# Patient Record
Sex: MALE | Race: NATIVE HAWAIIAN OR OTHER PACIFIC ISLANDER | NOT HISPANIC OR LATINO | Employment: STUDENT | URBAN - METROPOLITAN AREA
[De-identification: names, ages, dates, MRNs, and addresses within clinical notes are randomized per-mention and may not be internally consistent; named-entity substitution may affect disease eponyms.]

---

## 2022-07-02 ENCOUNTER — HOSPITAL ENCOUNTER (OUTPATIENT)
Facility: CLINIC | Age: 22
Setting detail: OBSERVATION
Discharge: HOME | End: 2022-07-03
Attending: EMERGENCY MEDICINE | Admitting: EMERGENCY MEDICINE
Payer: COMMERCIAL

## 2022-07-02 ENCOUNTER — APPOINTMENT (OUTPATIENT)
Dept: CT IMAGING | Facility: CLINIC | Age: 22
End: 2022-07-02
Attending: EMERGENCY MEDICINE
Payer: COMMERCIAL

## 2022-07-02 DIAGNOSIS — J36 PERITONSILLAR ABSCESS: ICD-10-CM

## 2022-07-02 LAB
ALBUMIN SERPL-MCNC: 3.9 G/DL (ref 3.4–5)
ALP SERPL-CCNC: 78 U/L (ref 65–260)
ALT SERPL W P-5'-P-CCNC: 25 U/L (ref 0–50)
ANION GAP SERPL CALCULATED.3IONS-SCNC: 4 MMOL/L (ref 3–14)
AST SERPL W P-5'-P-CCNC: 15 U/L (ref 0–35)
BASOPHILS # BLD AUTO: 0 10E3/UL (ref 0–0.2)
BASOPHILS NFR BLD AUTO: 0 %
BILIRUB SERPL-MCNC: 1.4 MG/DL (ref 0.2–1.3)
BUN SERPL-MCNC: 12 MG/DL (ref 7–30)
CALCIUM SERPL-MCNC: 9.5 MG/DL (ref 8.5–10.1)
CHLORIDE BLD-SCNC: 102 MMOL/L (ref 98–110)
CO2 SERPL-SCNC: 28 MMOL/L (ref 20–32)
CREAT SERPL-MCNC: 1.03 MG/DL (ref 0.5–1)
DEPRECATED S PYO AG THROAT QL EIA: POSITIVE
EOSINOPHIL # BLD AUTO: 0.1 10E3/UL (ref 0–0.7)
EOSINOPHIL NFR BLD AUTO: 1 %
ERYTHROCYTE [DISTWIDTH] IN BLOOD BY AUTOMATED COUNT: 11.6 % (ref 10–15)
FLUAV RNA SPEC QL NAA+PROBE: NEGATIVE
FLUBV RNA RESP QL NAA+PROBE: NEGATIVE
GFR SERPL CREATININE-BSD FRML MDRD: >90 ML/MIN/1.73M2
GLUCOSE BLD-MCNC: 88 MG/DL (ref 70–99)
HCT VFR BLD AUTO: 42.8 % (ref 40–53)
HGB BLD-MCNC: 14.5 G/DL (ref 13.3–17.7)
IMM GRANULOCYTES # BLD: 0 10E3/UL
IMM GRANULOCYTES NFR BLD: 0 %
LYMPHOCYTES # BLD AUTO: 0.9 10E3/UL (ref 0.8–5.3)
LYMPHOCYTES NFR BLD AUTO: 9 %
MCH RBC QN AUTO: 29.5 PG (ref 26.5–33)
MCHC RBC AUTO-ENTMCNC: 33.9 G/DL (ref 31.5–36.5)
MCV RBC AUTO: 87 FL (ref 78–100)
MONOCYTES # BLD AUTO: 1 10E3/UL (ref 0–1.3)
MONOCYTES NFR BLD AUTO: 10 %
MONOCYTES NFR BLD AUTO: NEGATIVE %
NEUTROPHILS # BLD AUTO: 8.5 10E3/UL (ref 1.6–8.3)
NEUTROPHILS NFR BLD AUTO: 80 %
NRBC # BLD AUTO: 0 10E3/UL
NRBC BLD AUTO-RTO: 0 /100
PLATELET # BLD AUTO: 297 10E3/UL (ref 150–450)
POTASSIUM BLD-SCNC: 3.7 MMOL/L (ref 3.4–5.3)
PROT SERPL-MCNC: 7.9 G/DL (ref 6.8–8.8)
RBC # BLD AUTO: 4.91 10E6/UL (ref 4.4–5.9)
RSV RNA SPEC NAA+PROBE: NEGATIVE
SARS-COV-2 RNA RESP QL NAA+PROBE: NEGATIVE
SODIUM SERPL-SCNC: 134 MMOL/L (ref 133–144)
WBC # BLD AUTO: 10.6 10E3/UL (ref 4–11)

## 2022-07-02 PROCEDURE — 36415 COLL VENOUS BLD VENIPUNCTURE: CPT | Performed by: EMERGENCY MEDICINE

## 2022-07-02 PROCEDURE — 70491 CT SOFT TISSUE NECK W/DYE: CPT

## 2022-07-02 PROCEDURE — 250N000009 HC RX 250: Performed by: EMERGENCY MEDICINE

## 2022-07-02 PROCEDURE — 96361 HYDRATE IV INFUSION ADD-ON: CPT | Mod: 59

## 2022-07-02 PROCEDURE — 87070 CULTURE OTHR SPECIMN AEROBIC: CPT | Performed by: EMERGENCY MEDICINE

## 2022-07-02 PROCEDURE — 250N000011 HC RX IP 250 OP 636: Performed by: INTERNAL MEDICINE

## 2022-07-02 PROCEDURE — 99285 EMERGENCY DEPT VISIT HI MDM: CPT | Mod: 25

## 2022-07-02 PROCEDURE — 96376 TX/PRO/DX INJ SAME DRUG ADON: CPT | Mod: 59

## 2022-07-02 PROCEDURE — 85025 COMPLETE CBC W/AUTO DIFF WBC: CPT | Performed by: EMERGENCY MEDICINE

## 2022-07-02 PROCEDURE — 250N000011 HC RX IP 250 OP 636: Performed by: EMERGENCY MEDICINE

## 2022-07-02 PROCEDURE — 258N000003 HC RX IP 258 OP 636: Performed by: EMERGENCY MEDICINE

## 2022-07-02 PROCEDURE — 250N000013 HC RX MED GY IP 250 OP 250 PS 637: Performed by: INTERNAL MEDICINE

## 2022-07-02 PROCEDURE — 87880 STREP A ASSAY W/OPTIC: CPT | Performed by: EMERGENCY MEDICINE

## 2022-07-02 PROCEDURE — 99219 PR INITIAL OBSERVATION CARE,LEVEL II: CPT | Performed by: INTERNAL MEDICINE

## 2022-07-02 PROCEDURE — 82040 ASSAY OF SERUM ALBUMIN: CPT | Performed by: EMERGENCY MEDICINE

## 2022-07-02 PROCEDURE — 96375 TX/PRO/DX INJ NEW DRUG ADDON: CPT

## 2022-07-02 PROCEDURE — 80053 COMPREHEN METABOLIC PANEL: CPT | Performed by: EMERGENCY MEDICINE

## 2022-07-02 PROCEDURE — 96366 THER/PROPH/DIAG IV INF ADDON: CPT | Mod: 59

## 2022-07-02 PROCEDURE — G0378 HOSPITAL OBSERVATION PER HR: HCPCS

## 2022-07-02 PROCEDURE — 96365 THER/PROPH/DIAG IV INF INIT: CPT | Mod: 59

## 2022-07-02 PROCEDURE — 10160 PNXR ASPIR ABSC HMTMA BULLA: CPT

## 2022-07-02 PROCEDURE — 86308 HETEROPHILE ANTIBODY SCREEN: CPT | Performed by: EMERGENCY MEDICINE

## 2022-07-02 PROCEDURE — 87637 SARSCOV2&INF A&B&RSV AMP PRB: CPT | Performed by: EMERGENCY MEDICINE

## 2022-07-02 RX ORDER — DEXTROAMPHETAMINE SACCHARATE, AMPHETAMINE ASPARTATE, DEXTROAMPHETAMINE SULFATE AND AMPHETAMINE SULFATE 2.5; 2.5; 2.5; 2.5 MG/1; MG/1; MG/1; MG/1
TABLET ORAL
COMMUNITY
Start: 2022-03-13

## 2022-07-02 RX ORDER — IOPAMIDOL 755 MG/ML
500 INJECTION, SOLUTION INTRAVASCULAR ONCE
Status: COMPLETED | OUTPATIENT
Start: 2022-07-02 | End: 2022-07-02

## 2022-07-02 RX ORDER — ONDANSETRON 4 MG/1
4 TABLET, ORALLY DISINTEGRATING ORAL EVERY 6 HOURS PRN
Status: DISCONTINUED | OUTPATIENT
Start: 2022-07-02 | End: 2022-07-03 | Stop reason: HOSPADM

## 2022-07-02 RX ORDER — ONDANSETRON 2 MG/ML
4 INJECTION INTRAMUSCULAR; INTRAVENOUS ONCE
Status: COMPLETED | OUTPATIENT
Start: 2022-07-02 | End: 2022-07-02

## 2022-07-02 RX ORDER — DIPHENHYDRAMINE HCL 25 MG
25-50 CAPSULE ORAL
Status: DISCONTINUED | OUTPATIENT
Start: 2022-07-02 | End: 2022-07-03 | Stop reason: HOSPADM

## 2022-07-02 RX ORDER — AMPICILLIN AND SULBACTAM 2; 1 G/1; G/1
3 INJECTION, POWDER, FOR SOLUTION INTRAMUSCULAR; INTRAVENOUS ONCE
Status: COMPLETED | OUTPATIENT
Start: 2022-07-02 | End: 2022-07-02

## 2022-07-02 RX ORDER — AMOXICILLIN 250 MG
1 CAPSULE ORAL 2 TIMES DAILY PRN
Status: DISCONTINUED | OUTPATIENT
Start: 2022-07-02 | End: 2022-07-03 | Stop reason: HOSPADM

## 2022-07-02 RX ORDER — AMPICILLIN AND SULBACTAM 2; 1 G/1; G/1
3 INJECTION, POWDER, FOR SOLUTION INTRAMUSCULAR; INTRAVENOUS EVERY 6 HOURS
Status: DISCONTINUED | OUTPATIENT
Start: 2022-07-02 | End: 2022-07-03 | Stop reason: HOSPADM

## 2022-07-02 RX ORDER — DEXAMETHASONE SODIUM PHOSPHATE 10 MG/ML
8 INJECTION, SOLUTION INTRAMUSCULAR; INTRAVENOUS EVERY 8 HOURS
Status: DISCONTINUED | OUTPATIENT
Start: 2022-07-03 | End: 2022-07-03 | Stop reason: HOSPADM

## 2022-07-02 RX ORDER — KETOROLAC TROMETHAMINE 15 MG/ML
15 INJECTION, SOLUTION INTRAMUSCULAR; INTRAVENOUS ONCE
Status: COMPLETED | OUTPATIENT
Start: 2022-07-02 | End: 2022-07-02

## 2022-07-02 RX ORDER — DIPHENHYDRAMINE HYDROCHLORIDE 50 MG/ML
25 INJECTION INTRAMUSCULAR; INTRAVENOUS EVERY 6 HOURS PRN
Status: DISCONTINUED | OUTPATIENT
Start: 2022-07-02 | End: 2022-07-03 | Stop reason: HOSPADM

## 2022-07-02 RX ORDER — DEXTROAMPHETAMINE SULFATE, DEXTROAMPHETAMINE SACCHARATE, AMPHETAMINE SULFATE AND AMPHETAMINE ASPARTATE 7.5; 7.5; 7.5; 7.5 MG/1; MG/1; MG/1; MG/1
CAPSULE, EXTENDED RELEASE ORAL
COMMUNITY
Start: 2022-03-13

## 2022-07-02 RX ORDER — AMOXICILLIN 250 MG
2 CAPSULE ORAL 2 TIMES DAILY PRN
Status: DISCONTINUED | OUTPATIENT
Start: 2022-07-02 | End: 2022-07-03 | Stop reason: HOSPADM

## 2022-07-02 RX ORDER — MENTHOL
LOZENGE MUCOUS MEMBRANE DAILY PRN
Status: DISCONTINUED | OUTPATIENT
Start: 2022-07-02 | End: 2022-07-03 | Stop reason: HOSPADM

## 2022-07-02 RX ORDER — LIDOCAINE HYDROCHLORIDE AND EPINEPHRINE BITARTRATE 20; .01 MG/ML; MG/ML
1 INJECTION, SOLUTION SUBCUTANEOUS ONCE
Status: DISCONTINUED | OUTPATIENT
Start: 2022-07-02 | End: 2022-07-02

## 2022-07-02 RX ORDER — ACETAMINOPHEN 325 MG/1
650 TABLET ORAL EVERY 6 HOURS PRN
Status: DISCONTINUED | OUTPATIENT
Start: 2022-07-02 | End: 2022-07-03 | Stop reason: HOSPADM

## 2022-07-02 RX ORDER — IBUPROFEN 200 MG
200 TABLET ORAL EVERY 6 HOURS PRN
Status: DISCONTINUED | OUTPATIENT
Start: 2022-07-02 | End: 2022-07-03 | Stop reason: HOSPADM

## 2022-07-02 RX ORDER — PANTOPRAZOLE SODIUM 40 MG/1
40 TABLET, DELAYED RELEASE ORAL
Status: DISCONTINUED | OUTPATIENT
Start: 2022-07-03 | End: 2022-07-03 | Stop reason: HOSPADM

## 2022-07-02 RX ORDER — LIDOCAINE HYDROCHLORIDE AND EPINEPHRINE 10; 10 MG/ML; UG/ML
INJECTION, SOLUTION INFILTRATION; PERINEURAL
Status: DISCONTINUED
Start: 2022-07-02 | End: 2022-07-02 | Stop reason: HOSPADM

## 2022-07-02 RX ORDER — DEXAMETHASONE SODIUM PHOSPHATE 10 MG/ML
10 INJECTION, SOLUTION INTRAMUSCULAR; INTRAVENOUS ONCE
Status: COMPLETED | OUTPATIENT
Start: 2022-07-02 | End: 2022-07-02

## 2022-07-02 RX ORDER — POLYETHYLENE GLYCOL 3350 17 G/17G
17 POWDER, FOR SOLUTION ORAL DAILY PRN
Status: DISCONTINUED | OUTPATIENT
Start: 2022-07-02 | End: 2022-07-03 | Stop reason: HOSPADM

## 2022-07-02 RX ORDER — HYDROMORPHONE HYDROCHLORIDE 1 MG/ML
0.5 INJECTION, SOLUTION INTRAMUSCULAR; INTRAVENOUS; SUBCUTANEOUS
Status: COMPLETED | OUTPATIENT
Start: 2022-07-02 | End: 2022-07-02

## 2022-07-02 RX ORDER — SODIUM CHLORIDE 9 MG/ML
INJECTION, SOLUTION INTRAVENOUS CONTINUOUS
Status: DISCONTINUED | OUTPATIENT
Start: 2022-07-02 | End: 2022-07-02

## 2022-07-02 RX ORDER — ONDANSETRON 2 MG/ML
4 INJECTION INTRAMUSCULAR; INTRAVENOUS EVERY 6 HOURS PRN
Status: DISCONTINUED | OUTPATIENT
Start: 2022-07-02 | End: 2022-07-03 | Stop reason: HOSPADM

## 2022-07-02 RX ORDER — ACETAMINOPHEN 650 MG/1
650 SUPPOSITORY RECTAL EVERY 6 HOURS PRN
Status: DISCONTINUED | OUTPATIENT
Start: 2022-07-02 | End: 2022-07-03 | Stop reason: HOSPADM

## 2022-07-02 RX ORDER — DEXTROAMPHETAMINE SACCHARATE, AMPHETAMINE ASPARTATE, DEXTROAMPHETAMINE SULFATE AND AMPHETAMINE SULFATE 7.5; 7.5; 7.5; 7.5 MG/1; MG/1; MG/1; MG/1
30 TABLET ORAL EVERY MORNING
COMMUNITY
End: 2022-07-02

## 2022-07-02 RX ORDER — DEXTROAMPHETAMINE SACCHARATE, AMPHETAMINE ASPARTATE MONOHYDRATE, DEXTROAMPHETAMINE SULFATE AND AMPHETAMINE SULFATE 3.75; 3.75; 3.75; 3.75 MG/1; MG/1; MG/1; MG/1
30 CAPSULE, EXTENDED RELEASE ORAL EVERY MORNING
Status: DISCONTINUED | OUTPATIENT
Start: 2022-07-03 | End: 2022-07-03 | Stop reason: HOSPADM

## 2022-07-02 RX ADMIN — DEXAMETHASONE SODIUM PHOSPHATE 8 MG: 10 INJECTION, SOLUTION INTRAMUSCULAR; INTRAVENOUS at 23:46

## 2022-07-02 RX ADMIN — IOPAMIDOL 90 ML: 755 INJECTION, SOLUTION INTRAVENOUS at 17:25

## 2022-07-02 RX ADMIN — KETOROLAC TROMETHAMINE 15 MG: 15 INJECTION, SOLUTION INTRAMUSCULAR; INTRAVENOUS at 16:58

## 2022-07-02 RX ADMIN — SODIUM CHLORIDE 65 ML: 9 INJECTION, SOLUTION INTRAVENOUS at 17:25

## 2022-07-02 RX ADMIN — AMPICILLIN SODIUM AND SULBACTAM SODIUM 3 G: 2; 1 INJECTION, POWDER, FOR SOLUTION INTRAMUSCULAR; INTRAVENOUS at 22:35

## 2022-07-02 RX ADMIN — IBUPROFEN 200 MG: 200 TABLET, FILM COATED ORAL at 22:40

## 2022-07-02 RX ADMIN — HYDROMORPHONE HYDROCHLORIDE 0.5 MG: 1 INJECTION, SOLUTION INTRAMUSCULAR; INTRAVENOUS; SUBCUTANEOUS at 17:01

## 2022-07-02 RX ADMIN — AMPICILLIN SODIUM AND SULBACTAM SODIUM 3 G: 2; 1 INJECTION, POWDER, FOR SOLUTION INTRAMUSCULAR; INTRAVENOUS at 17:02

## 2022-07-02 RX ADMIN — Medication 1 MG: at 23:47

## 2022-07-02 RX ADMIN — SODIUM CHLORIDE 1000 ML: 9 INJECTION, SOLUTION INTRAVENOUS at 15:42

## 2022-07-02 RX ADMIN — DEXAMETHASONE SODIUM PHOSPHATE 10 MG: 10 INJECTION, SOLUTION INTRAMUSCULAR; INTRAVENOUS at 16:59

## 2022-07-02 RX ADMIN — ONDANSETRON 4 MG: 2 INJECTION INTRAMUSCULAR; INTRAVENOUS at 15:43

## 2022-07-02 RX ADMIN — HYDROMORPHONE HYDROCHLORIDE 0.5 MG: 1 INJECTION, SOLUTION INTRAMUSCULAR; INTRAVENOUS; SUBCUTANEOUS at 15:44

## 2022-07-02 NOTE — ED TRIAGE NOTES
Pt arrives with c/o 4 months of ongoing illness including sinus infections, bronchitis, and most recently strep. Pt has tested negative for covid. Pt reports headache, ear pain, neck pain, throat pain. Pt reports 10lb weight loss in the last week. No meds taken PTA.     Triage Assessment     Row Name 07/02/22 1254       Triage Assessment (Adult)    Airway WDL WDL       Respiratory WDL    Respiratory WDL X;cough       Skin Circulation/Temperature WDL    Skin Circulation/Temperature WDL WDL       Cardiac WDL    Cardiac WDL WDL       Peripheral/Neurovascular WDL    Peripheral Neurovascular WDL WDL       Cognitive/Neuro/Behavioral WDL    Cognitive/Neuro/Behavioral WDL WDL

## 2022-07-02 NOTE — ED PROVIDER NOTES
History   Chief Complaint:  Multiple Complaints       The history is provided by the patient and a parent.      Mohinder Rodriguez is a 21 year old male who presents with 4 months of ongoing illness including sinus infections, bronchitis, and strep. He has been experiencing ongoing sore throat, ear pain, headaches, cough, congestion, neck pain, unintentional weight loss, and decreased appetite and fluid intake. He has not taken any oral analgesics today. He states that he experiences chest pain during hard coughing episodes. He denies fever, abdominal pain, dysuria, frequency, vomiting, or shortness of breath.    His mother notes that he may have had COVID-19 while at college, although he tested negative. During this time, he lost his ability to taste.    Review of Systems   All other systems reviewed and are negative.    Allergies:  The patient has no known allergies.     Medications:  Catapres  Concerta  Zoloft  Adderall    Past Medical History:     ADHD  Anxiety     Past Surgical History:    None    Family History:    None    Social History:  The patient presents to the ED with his mother.    Physical Exam     Patient Vitals for the past 24 hrs:   BP Temp Temp src Pulse Resp SpO2   07/02/22 2000 134/76 -- -- -- 16 99 %   07/02/22 1715 -- -- -- -- -- 95 %   07/02/22 1700 -- -- -- -- -- 98 %   07/02/22 1645 -- -- -- -- -- 97 %   07/02/22 1630 -- -- -- -- -- 96 %   07/02/22 1615 -- -- -- -- -- 97 %   07/02/22 1600 -- -- -- -- -- 95 %   07/02/22 1254 (!) 144/75 99  F (37.2  C) Temporal 103 18 99 %       Physical Exam  General: Resting on the bed.  Skin night secretions well.  Appears comfortable.  Head: No obvious trauma to head.  Ears, Nose, Throat:  External ears normal.  Nose normal.  Trismus noted.  Soft floor the mouth.  Fullness and swelling appreciable on the left tonsil compared to the right.  Mild midline shift of the uvula.  Mild erythema and injection of the TM on the left, no fluid appreciable.  Clear  right TM.  Eyes:  Conjunctivae clear.  Pupils are equal, round, and reactive.   Neck: Normal range of motion.  Neck supple.  reports mild left sided anterior cervical chain discomfort.  No appreciable pain over the cricothyroid membrane.  CV: Regular rate and rhythm.  No murmurs.      Respiratory: Effort normal and breath sounds normal.  No wheezing or crackles.   Gastrointestinal: Soft.  No distension. There is no tenderness.     Neuro: Alert. Moving all extremities appropriately.  Normal speech.    Skin: Skin is warm and dry.  No rash noted.       Emergency Department Course     Imaging:  Soft tissue neck CT w contrast   Final Result   IMPRESSION:    1.  Markedly enlarged tonsils with a 2 x 2 centimeters peritonsillar abscess on the left.   2.  Laboratory changes extend inferiorly to involve the left side of the hypopharynx and supraglottic larynx consistent with supraglottitis. Mild thickening of the epiglottis on the left.   3.  There is moderate narrowing of the oropharyngeal airway.        Report per radiology    Laboratory:  Labs Ordered and Resulted from Time of ED Arrival to Time of ED Departure   COMPREHENSIVE METABOLIC PANEL - Abnormal       Result Value    Sodium 134      Potassium 3.7      Chloride 102      Carbon Dioxide (CO2) 28      Anion Gap 4      Urea Nitrogen 12      Creatinine 1.03 (*)     Calcium 9.5      Glucose 88      Alkaline Phosphatase 78      AST 15      ALT 25      Protein Total 7.9      Albumin 3.9      Bilirubin Total 1.4 (*)     GFR Estimate >90     CBC WITH PLATELETS AND DIFFERENTIAL - Abnormal    WBC Count 10.6      RBC Count 4.91      Hemoglobin 14.5      Hematocrit 42.8      MCV 87      MCH 29.5      MCHC 33.9      RDW 11.6      Platelet Count 297      % Neutrophils 80      % Lymphocytes 9      % Monocytes 10      % Eosinophils 1      % Basophils 0      % Immature Granulocytes 0      NRBCs per 100 WBC 0      Absolute Neutrophils 8.5 (*)     Absolute Lymphocytes 0.9      Absolute  Monocytes 1.0      Absolute Eosinophils 0.1      Absolute Basophils 0.0      Absolute Immature Granulocytes 0.0      Absolute NRBCs 0.0     STREPTOCOCCUS A RAPID SCREEN W REFELX TO PCR - Abnormal    Group A Strep antigen Positive (*)    MONONUCLEOSIS SCREEN - Normal    Mononucleosis Screen Negative     INFLUENZA A/B & SARS-COV2 PCR MULTIPLEX - Normal    Influenza A PCR Negative      Influenza B PCR Negative      RSV PCR Negative      SARS CoV2 PCR Negative     AEROBIC BACTERIAL CULTURE ROUTINE        Procedures    Incision and Drainage     Procedure: Incision and Drainage     Consent: Verbal    Indication: Abscess    Location: Left Tonsil    Size: 2x2 cm    Ultrasound Guidance: No    Preparation: None     Anesthesia/Sedation: Lidocaine with Epinephrine - 1% and Benzocaine 20%     Procedure Detail:   Aspiration: Yes, 2.5 CCs  Incision Type: 18 gauge needle aspiration  Scalpel: None  Wound Management: Left open   Packing: None     Patient Status: The patient tolerated the procedure well: Yes. There were no complications.    Emergency Department Course:    Reviewed:  I reviewed nursing notes, vitals, past medical history and Care Everywhere    Assessments:  1511 I obtained history and examined the patient as noted above.   1650 I rechecked the patient and explained findings.   1950 I rechecked the patient and performed the procedure noted above.    Consults:  1923 I spoke with Dr. Lauren Cruz with ENT regarding this patient's presentation, findings, and plan of care.  1943 I spoke with Dr. Berry from the hospitalist service regarding the patient's presentation, findings here in the ED, and plan of care.     Interventions:  1542 NS 1 L IV  1543 Zofran 4 mg IV  1544 Dilaudid 0.5 mg IV  1658 Toradol 15 mg IV  1659 Decadron 10 mg IV  1701 Dilaudid 0.5 mg IV  1725 Iopamidol 90 ml IV   Saline 65 ml IV    Disposition:  The patient was admitted to the hospital under the care of Dr. Berry.     Impression & Plan     Medical  Decision Makin-year-old male presents with sore throat, decreased appetite.  Vital signs are reassuring.  Broad differentials pursued include not limited to sepsis, peritonsillar abscess, retropharyngeal abscess, epiglottitis, tracheitis, dehydration, mono, COVID-19, influenza, strep pharyngitis, Terence's angina, etc.  CBC without leukocytosis or anemia.  BMP without acute electrolyte, metabolic or renal dysfunction.  LFTs reassuring.  Mono negative.  COVID influenza negative.  Rapid strep positive.  On examination there is significant swelling of the left tonsil with midline shift of the uvula.  Patient also has noted trismus.  Given his tenderness down his neck I did obtain a CT scan.  CT confirms a left peritonsillar abscess but also some inflammation extending into the inferior left hypopharynx and supraglottic area consistent with supraglottitis.  Patient is handling secretions well.  No difficulty breathing.  Eating and drinking normally.  I spoke with ENT they agree with needle aspiration of the PTA, admission for Decadron and Unasyn.  After aspiration there was a notable decrease in the swelling on exam.  Patient looks improved.  Patient was admitted to the hospitalist.    Diagnosis:    ICD-10-CM    1. Peritonsillar abscess  J36        Scribe Disclosure:  Krish BRADEN, am serving as a scribe at 2:02 PM on 2022 to document services personally performed by Sarah Rand MD based on my observations and the provider's statements to me.          Sarah Rand MD  [phone]

## 2022-07-02 NOTE — PHARMACY-ADMISSION MEDICATION HISTORY
Admission medication history interview status for this patient is complete. See [facility] admission navigator for allergy information, prior to admission medications and immunization status.     Medication history interview done, indicate source(s): Patient  Medication history resources (including written lists, pill bottles, clinic record): [facility] dispense records  Pharmacy: [facility]/pharmacy #[id number] - [place], [place] - [address] AT [facility] [phone]      Changes made to PTA medication list:  Added: Adderall      Actions taken by pharmacist (provider contacted, etc):None     Additional medication history information: Only takes Adderall during school year.    Medication reconciliation/reorder completed by provider prior to medication history?  No      Prior to Admission medications    Medication Sig Last Dose Taking? Auth Provider Long Term End Date   ADDERALL XR 30 MG 24 hr capsule TAKE 1 CAPSULE (30 MG) BY MOUTH DAILY IN THE MORNING More than a month at Unknown time Yes Unknown, Entered By History     amphetamine-dextroamphetamine (ADDERALL) 10 MG tablet Take 1 to 2 tablets (10-20 mg) by mouth as needed More than a month at Unknown time Yes Unknown, Entered By History

## 2022-07-03 VITALS
BODY MASS INDEX: 28.32 KG/M2 | OXYGEN SATURATION: 97 % | WEIGHT: 191.2 LBS | TEMPERATURE: 97.9 F | SYSTOLIC BLOOD PRESSURE: 113 MMHG | HEIGHT: 69 IN | HEART RATE: 80 BPM | DIASTOLIC BLOOD PRESSURE: 51 MMHG | RESPIRATION RATE: 20 BRPM

## 2022-07-03 LAB
ANION GAP SERPL CALCULATED.3IONS-SCNC: 6 MMOL/L (ref 3–14)
BUN SERPL-MCNC: 15 MG/DL (ref 7–30)
CALCIUM SERPL-MCNC: 10.1 MG/DL (ref 8.5–10.1)
CHLORIDE BLD-SCNC: 101 MMOL/L (ref 98–110)
CO2 SERPL-SCNC: 29 MMOL/L (ref 20–32)
CREAT SERPL-MCNC: 0.95 MG/DL (ref 0.5–1)
ERYTHROCYTE [DISTWIDTH] IN BLOOD BY AUTOMATED COUNT: 11.4 % (ref 10–15)
GFR SERPL CREATININE-BSD FRML MDRD: >90 ML/MIN/1.73M2
GLUCOSE BLD-MCNC: 143 MG/DL (ref 70–99)
HCT VFR BLD AUTO: 43.3 % (ref 40–53)
HGB BLD-MCNC: 14.5 G/DL (ref 13.3–17.7)
MCH RBC QN AUTO: 29.2 PG (ref 26.5–33)
MCHC RBC AUTO-ENTMCNC: 33.5 G/DL (ref 31.5–36.5)
MCV RBC AUTO: 87 FL (ref 78–100)
PLATELET # BLD AUTO: 323 10E3/UL (ref 150–450)
POTASSIUM BLD-SCNC: 4.3 MMOL/L (ref 3.4–5.3)
RBC # BLD AUTO: 4.96 10E6/UL (ref 4.4–5.9)
SODIUM SERPL-SCNC: 136 MMOL/L (ref 133–144)
WBC # BLD AUTO: 10.1 10E3/UL (ref 4–11)

## 2022-07-03 PROCEDURE — 250N000013 HC RX MED GY IP 250 OP 250 PS 637: Performed by: INTERNAL MEDICINE

## 2022-07-03 PROCEDURE — 85027 COMPLETE CBC AUTOMATED: CPT | Performed by: INTERNAL MEDICINE

## 2022-07-03 PROCEDURE — 99217 PR OBSERVATION CARE DISCHARGE: CPT | Performed by: PHYSICIAN ASSISTANT

## 2022-07-03 PROCEDURE — 82310 ASSAY OF CALCIUM: CPT | Performed by: INTERNAL MEDICINE

## 2022-07-03 PROCEDURE — G0378 HOSPITAL OBSERVATION PER HR: HCPCS

## 2022-07-03 PROCEDURE — 36415 COLL VENOUS BLD VENIPUNCTURE: CPT | Performed by: INTERNAL MEDICINE

## 2022-07-03 PROCEDURE — 96376 TX/PRO/DX INJ SAME DRUG ADON: CPT

## 2022-07-03 PROCEDURE — 250N000011 HC RX IP 250 OP 636: Performed by: INTERNAL MEDICINE

## 2022-07-03 RX ORDER — FLUTICASONE PROPIONATE 50 MCG
2 SPRAY, SUSPENSION (ML) NASAL DAILY
Qty: 16 G | Refills: 0 | Status: SHIPPED | OUTPATIENT
Start: 2022-07-03 | End: 2022-07-17

## 2022-07-03 RX ORDER — ACETAMINOPHEN 325 MG/1
650 TABLET ORAL EVERY 6 HOURS PRN
COMMUNITY
Start: 2022-07-03 | End: 2023-08-09

## 2022-07-03 RX ORDER — IBUPROFEN 200 MG
400 TABLET ORAL EVERY 6 HOURS PRN
COMMUNITY
Start: 2022-07-03 | End: 2023-08-09

## 2022-07-03 RX ORDER — PANTOPRAZOLE SODIUM 40 MG/1
40 TABLET, DELAYED RELEASE ORAL
Qty: 20 TABLET | Refills: 0 | Status: SHIPPED | OUTPATIENT
Start: 2022-07-04 | End: 2023-08-09

## 2022-07-03 RX ORDER — PREDNISONE 10 MG/1
TABLET ORAL
Qty: 50 TABLET | Refills: 0 | Status: SHIPPED | OUTPATIENT
Start: 2022-07-03 | End: 2023-08-09

## 2022-07-03 RX ADMIN — PANTOPRAZOLE SODIUM 40 MG: 40 TABLET, DELAYED RELEASE ORAL at 07:31

## 2022-07-03 RX ADMIN — DEXAMETHASONE SODIUM PHOSPHATE 8 MG: 10 INJECTION, SOLUTION INTRAMUSCULAR; INTRAVENOUS at 07:31

## 2022-07-03 RX ADMIN — ACETAMINOPHEN 650 MG: 325 TABLET, FILM COATED ORAL at 07:31

## 2022-07-03 RX ADMIN — AMPICILLIN SODIUM AND SULBACTAM SODIUM 3 G: 2; 1 INJECTION, POWDER, FOR SOLUTION INTRAMUSCULAR; INTRAVENOUS at 10:42

## 2022-07-03 RX ADMIN — ONDANSETRON 4 MG: 4 TABLET, ORALLY DISINTEGRATING ORAL at 16:52

## 2022-07-03 RX ADMIN — AMPICILLIN SODIUM AND SULBACTAM SODIUM 3 G: 2; 1 INJECTION, POWDER, FOR SOLUTION INTRAMUSCULAR; INTRAVENOUS at 05:19

## 2022-07-03 RX ADMIN — AMPICILLIN SODIUM AND SULBACTAM SODIUM 3 G: 2; 1 INJECTION, POWDER, FOR SOLUTION INTRAMUSCULAR; INTRAVENOUS at 16:49

## 2022-07-03 RX ADMIN — IBUPROFEN 200 MG: 200 TABLET, FILM COATED ORAL at 10:50

## 2022-07-03 RX ADMIN — DEXAMETHASONE SODIUM PHOSPHATE 8 MG: 10 INJECTION, SOLUTION INTRAMUSCULAR; INTRAVENOUS at 16:47

## 2022-07-03 RX ADMIN — ONDANSETRON 4 MG: 4 TABLET, ORALLY DISINTEGRATING ORAL at 07:31

## 2022-07-03 NOTE — PLAN OF CARE
PRIMARY DIAGNOSIS: ACUTE PAIN  OUTPATIENT/OBSERVATION GOALS TO BE MET BEFORE DISCHARGE:  1. Pain Status: Improved-controlled with oral pain medications.     2. Return to near baseline physical activity: Yes     3. Cleared for discharge by consultants (if involved): ENT to see     Discharge Planner Nurse   Safe discharge environment identified: Yes  Barriers to discharge: Yes       Entered by: Kristen Holland RN 07/03/2022 12:00 PM  Please review provider order for any additional goals.   Nurse to notify provider when observation goals have been met and patient is ready for discharge.     A&Ox4, ibuprofen for pain, regular diet, to receive IV decadron and IV unasyn this evening and discharge with oral, will continue to provide supportive cares.

## 2022-07-03 NOTE — PLAN OF CARE
"  PRIMARY DIAGNOSIS: ACUTE PAIN  OUTPATIENT/OBSERVATION GOALS TO BE MET BEFORE DISCHARGE:  1. Pain Status: Improved-controlled with oral pain medications.    2. Return to near baseline physical activity: No    3. Cleared for discharge by consultants (if involved): No    Discharge Planner Nurse   Safe discharge environment identified: Yes  Barriers to discharge: Yes       Entered by: Evens Reed RN 07/03/2022 12:13 AM    /67 (BP Location: Left arm)   Pulse 79   Temp 98.6  F (37  C) (Oral)   Resp 16   Ht 1.753 m (5' 9\")   Wt 86.7 kg (191 lb 3.2 oz)   SpO2 97%   BMI 28.24 kg/m      Please review provider order for any additional goals.   Nurse to notify provider when observation goals have been met and patient is ready for discharge.  "

## 2022-07-03 NOTE — PLAN OF CARE
"PRIMARY DIAGNOSIS: ACUTE PAIN  OUTPATIENT/OBSERVATION GOALS TO BE MET BEFORE DISCHARGE:  1. Pain Status: Improved-controlled with oral pain medications.    2. Return to near baseline physical activity: No    3. Cleared for discharge by consultants (if involved): No    Discharge Planner Nurse   Safe discharge environment identified: Yes  Barriers to discharge: Yes       Entered by: Evens Reed RN 07/03/2022 6:39 AM    /57 (BP Location: Left arm)   Pulse 68   Temp 97.8  F (36.6  C) (Oral)   Resp 18   Ht 1.753 m (5' 9\")   Wt 86.7 kg (191 lb 3.2 oz)   SpO2 98%   BMI 28.24 kg/m    Pt is alert and oriented x4. Pt mother at bedside. Pt was given Ibuprofen for throat pain and Melatonin for sleep. Pt is independent in transfer and cares. No acute distress noted. VSS. pt lungs are clear. Skin is intact. Pt was given his scheduled Ampicillin-Sulbactam and Decadron. Pt is sleeping in bed and call light is within reach. Will continue to monitor and assess pt.   Please review provider order for any additional goals.   Nurse to notify provider when observation goals have been met and patient is ready for discharge.  "

## 2022-07-03 NOTE — PLAN OF CARE
PRIMARY DIAGNOSIS: ACUTE PAIN  OUTPATIENT/OBSERVATION GOALS TO BE MET BEFORE DISCHARGE:  1. Pain Status: Improved-controlled with oral pain medications.    2. Return to near baseline physical activity: Yes    3. Cleared for discharge by consultants (if involved): ENT to see    Discharge Planner Nurse   Safe discharge environment identified: Yes  Barriers to discharge: Yes       Entered by: Kristen Holland RN 07/03/2022 7:52 AM     Please review provider order for any additional goals.   Nurse to notify provider when observation goals have been met and patient is ready for discharge.    A&Ox4, up ad kiran, reports some nausea - zofran given, reports 7/10 throat pain - tylenol given, regular diet - tolerated sandwich and pudding, on IV decadron and IV unasyn, ENT to see, will continue to provide supportive cares.

## 2022-07-03 NOTE — PLAN OF CARE
ROOM # [id number]    Living Situation (if not independent, order SW consult): lives at home with mom  Facility name:  : [facility]    Activity level at baseline: [facility]  Activity level on admit:[facility]    Who will be transporting you at discharge: [facility]    Patient registered to observation; given Patient Bill of Rights; given the opportunity to ask questions about observation status and their plan of care.  Patient has been oriented to the observation room, bathroom and call light is in place.    Discussed discharge goals and expectations with patient/family.

## 2022-07-03 NOTE — ED NOTES
[facility]  ED Nurse Handoff Report    Mohinder Rodriguez is a 21 year old male   ED Chief complaint: Multiple Complaints  . ED Diagnosis:   Final diagnoses:   Peritonsillar abscess     Allergies: No Known Allergies    Code Status: Full Code  Activity level - Baseline/Home:  Independent. Activity Level - Current:   Independent. Lift room needed: No. Bariatric: No   Needed: No   Isolation: No. Infection: Not Applicable.     Vital Signs:   Vitals:    07/02/22 1645 07/02/22 1700 07/02/22 1715 07/02/22 2000   BP:    134/76   Pulse:       Resp:    16   Temp:       TempSrc:       SpO2: 97% 98% 95% 99%       Cardiac Rhythm:  ,      Pain level:    Patient confused: No. Patient Falls Risk: Yes.   Elimination Status: Has voided   Patient Report - Initial Complaint: sore throat. Focused Assessment:  The history is provided by the patient and a parent.      Mohinder Rodriguez is a 21 year old male who presents with 4 months of ongoing illness including sinus infections, bronchitis, and strep. He has been experiencing ongoing sore throat, ear pain, headaches, cough, congestion, neck pain, unintentional weight loss, and decreased appetite and fluid intake. He has not taken any oral analgesics today. He states that he experiences chest pain during hard coughing episodes. He denies fever, abdominal pain, dysuria, frequency, vomiting, or shortness of breath.     His mother notes that he may have had COVID-19 while at college, although he tested negative. During this time, he lost his ability to taste.     Review of Systems   All other systems reviewed and are negative.  Tests Performed: labs, CT. Abnormal Results:   Labs Ordered and Resulted from Time of ED Arrival to Time of ED Departure   COMPREHENSIVE METABOLIC PANEL - Abnormal       Result Value    Sodium 134      Potassium 3.7      Chloride 102      Carbon Dioxide (CO2) 28      Anion Gap 4      Urea Nitrogen 12      Creatinine 1.03 (*)     Calcium 9.5       Glucose 88      Alkaline Phosphatase 78      AST 15      ALT 25      Protein Total 7.9      Albumin 3.9      Bilirubin Total 1.4 (*)     GFR Estimate >90     CBC WITH PLATELETS AND DIFFERENTIAL - Abnormal    WBC Count 10.6      RBC Count 4.91      Hemoglobin 14.5      Hematocrit 42.8      MCV 87      MCH 29.5      MCHC 33.9      RDW 11.6      Platelet Count 297      % Neutrophils 80      % Lymphocytes 9      % Monocytes 10      % Eosinophils 1      % Basophils 0      % Immature Granulocytes 0      NRBCs per 100 WBC 0      Absolute Neutrophils 8.5 (*)     Absolute Lymphocytes 0.9      Absolute Monocytes 1.0      Absolute Eosinophils 0.1      Absolute Basophils 0.0      Absolute Immature Granulocytes 0.0      Absolute NRBCs 0.0     STREPTOCOCCUS A RAPID SCREEN W REFELX TO PCR - Abnormal    Group A Strep antigen Positive (*)    MONONUCLEOSIS SCREEN - Normal    Mononucleosis Screen Negative     INFLUENZA A/B & SARS-COV2 PCR MULTIPLEX - Normal    Influenza A PCR Negative      Influenza B PCR Negative      RSV PCR Negative      SARS CoV2 PCR Negative       Soft tissue neck CT w contrast   Final Result   IMPRESSION:    1.  Markedly enlarged tonsils with a 2 x 2 centimeters peritonsillar abscess on the left.   2.  Laboratory changes extend inferiorly to involve the left side of the hypopharynx and supraglottic larynx consistent with supraglottitis. Mild thickening of the epiglottis on the left.   3.  There is moderate narrowing of the oropharyngeal airway.        .   Treatments provided: drainage, see MAR   Family Comments: mom at bedside  OBS brochure/video discussed/provided to patient:  Yes  ED Medications:   Medications   0.9% sodium chloride BOLUS (0 mLs Intravenous Stopped 7/2/22 2005)     Followed by   sodium chloride 0.9% infusion (has no administration in time range)   benzocaine 20% (HURRICAINE/TOPEX) 20 % spray 1 mL (has no administration in time range)   lidocaine 2%-EPINEPHrine 1:100,000 2 %-1:[id number] injection  1 mL (has no administration in time range)   lidocaine 1% with EPINEPHrine 1:100,000 1 %-1:[id number] injection (has no administration in time range)   HYDROmorphone (PF) (DILAUDID) injection 0.5 mg (0.5 mg Intravenous Given 7/2/22 1544)   ondansetron (ZOFRAN) injection 4 mg (4 mg Intravenous Given 7/2/22 1543)   ampicillin-sulbactam (UNASYN) 3 g vial to attach to  mL bag (0 g Intravenous Stopped 7/2/22 2006)   dexamethasone PF (DECADRON) injection 10 mg (10 mg Intravenous Given 7/2/22 1659)   ketorolac (TORADOL) injection 15 mg (15 mg Intravenous Given 7/2/22 1658)   HYDROmorphone (PF) (DILAUDID) injection 0.5 mg (0.5 mg Intravenous Given 7/2/22 1701)   iopamidol (ISOVUE-370) solution 500 mL (90 mLs Intravenous Given 7/2/22 1725)   for CT scan flush use (65 mLs Intravenous Given 7/2/22 1725)     Drips infusing:  No  For the majority of the shift, the patient's behavior Green. Interventions performed were n/a.    Sepsis treatment initiated: No     Patient tested for COVID 19 prior to admission: YES    ED Nurse Name/Phone Number: Jumana Franklin RN,   8:07 PM  RECEIVING UNIT ED HANDOFF REVIEW    Above ED Nurse Handoff Report was reviewed: Yes  Reviewed by: Evens Reed RN on July 2, 2022 at 8:25 PM

## 2022-07-03 NOTE — PLAN OF CARE
PRIMARY DIAGNOSIS: ACUTE PAIN  OUTPATIENT/OBSERVATION GOALS TO BE MET BEFORE DISCHARGE:    1. Pain Status: Improved-controlled with oral pain medications.     2. Return to near baseline physical activity: Yes     3. Cleared for discharge by consultants (if involved): Yes     Discharge Planner Nurse   Safe discharge environment identified: Yes  Barriers to discharge: Yes       Entered by: Kristen Holland RN 07/03/2022 4:00 PM    Please review provider order for any additional goals.   Nurse to notify provider when observation goals have been met and patient is ready for discharge.     Discharge after IV meds.

## 2022-07-03 NOTE — PLAN OF CARE
Patient's After Visit Summary was reviewed with patient and father.   Patient verbalized understanding of After Visit Summary, recommended follow up and was given an opportunity to ask questions.   Discharge medications sent home with patient/family: Yes  Discharged with: father    OBSERVATION patient END time: 2427

## 2022-07-03 NOTE — H&P
[facility]  History and Physical  Hospitalist - Sanjay Berry DO       Date of Admission:  7/2/2022    Chief Complaint   Sore throat    History is obtained from the patient, the patient's mother at bedside, the emergency department physician, as well as the electronic medical record.    History of Present Illness   Mohinder Rodriguez is a 21 year old male with past medical history of ADD who presented on 7/2/2022 with chief complaint of sore throat.  The patient states that he has been having issues with his sinuses for the past 3 months.  Approximately 2 weeks ago he developed a sore throat.  He went to the urgency room in [place] and tested positive for strep pharyngitis.  He was prescribed amoxicillin for 10 days and ketorolac.  He states his sore throat improved.  He finished his antibiotics and the sore throat returned approximately 2 to 3 days ago.  He has been not able to sleep secondary to the pain.  He reports a subjective fever.  This morning the pain was so persistent that he felt like his throat was closing so he came to the emergency department.    In the emergency department, the patient was found of a temperature of 99.0  F, heart rate 103, blood pressure 144/75, respiratory rate 18, SPO2 99% on room air.  Initial blood work revealed creatinine 1.03, total bilirubin 1.4, WBC 10.6.  The patient had a positive strep screen.  CT soft tissue of the neck showed markedly enlarged tonsils with a 2 x 2 centimeter peritonsillar abscess on the left with evidence of supraglottitis and mild thickening of the epiglottis on the left and moderate narrowing of the oropharyngeal airway.  ENT was contacted by the emergency department who recommended IV Unasyn, IV dexamethasone, and attempted drainage of the abscess in the emergency department.  Abscess cultures were obtained.    ASSESSMENT/PLAN    Peritonsillar Abscess  Strep Throat  - Appreciate ENT recommendations  - s/p I&D in ED  - Continue IV  Unasyn - consider course of Augmentin at discharge  - Continue IV Decardron 8 mg q8h - consider prednisone taper at discharge  - Pain control    ADD  - Resume PTA Adderall    PLAN: Resume home medications as appropriate once confirmed by pharmacy.     DVT Prophylaxis: Pneumatic Compression Devices  Code Status: Full Code  Discharge Plan: Tomorrow to home pending ENT evaluation    Sanjay Berry, DO    Primary Care Physician   Physician No Ref-Primary    -----------------------------------------------------------------------------------------------------------------------------------------------------------------------------------------------------    Past Medical History    I have reviewed this patient's medical history and updated it with pertinent information if needed.   ADD    Past Surgical History   Elbow surgery    Prior to Admission Medications   Prior to Admission Medications   Prescriptions Last Dose Informant Patient Reported? Taking?   ADDERALL XR 30 MG 24 hr capsule More than a month at Unknown time  Yes Yes   Sig: TAKE 1 CAPSULE (30 MG) BY MOUTH DAILY IN THE MORNING   amphetamine-dextroamphetamine (ADDERALL) 10 MG tablet More than a month at Unknown time  Yes Yes   Sig: Take 1 to 2 tablets (10-20 mg) by mouth as needed      Facility-Administered Medications: None     Allergies   No Known Allergies    Social History   Pt denies hx of tobacco use    Family History   Pt denies family hx of disease    -----------------------------------------------------------------------------------------------------------------------------------------------------------------------------------------------------    Review of Systems   The 10 point Review of Systems is negative other than noted in the HPI or here.     Physical Exam   Temp: 99  F (37.2  C) Temp src: Temporal BP: (!) 144/75 Pulse: 103   Resp: 18 SpO2: 99 % O2 Device: None (Room air)    Vital Signs with Ranges  Temp:  [99  F (37.2  C)] 99  F (37.2  C)  Pulse:   [103] 103  Resp:  [18] 18  BP: (144)/(75) 144/75  SpO2:  [99 %] 99 %  0 lbs 0 oz    Constitutional: Awake, alert, cooperative, no apparent distress.  Eyes: Conjunctiva and pupils examined and normal.  HEENT: Moist mucous membranes, normal dentition.  Respiratory: Clear to auscultation bilaterally, no crackles or wheezing.  Cardiovascular: Regular rate and rhythm, normal S1 and S2, and no murmur noted.  GI: Soft, non-distended, non-tender, normal bowel sounds.  Lymph/Hematologic: No anterior cervical or supraclavicular adenopathy.  Skin: No rashes, no cyanosis, no edema.  Musculoskeletal: No joint swelling, erythema or tenderness.  Neurologic: Cranial nerves 2-12 intact, normal strength and sensation.  Psychiatric: Alert, oriented to person, place and time, no obvious anxiety or depression.     Data     Recent Labs   Lab 07/02/22  1542   WBC 10.6   HGB 14.5   MCV 87         POTASSIUM 3.7   CHLORIDE 102   CO2 28   BUN 12   CR 1.03*   ANIONGAP 4   PB 9.5   GLC 88   ALBUMIN 3.9   PROTTOTAL 7.9   BILITOTAL 1.4*   ALKPHOS 78   ALT 25   AST 15       Recent Results (from the past 24 hour(s))   Soft tissue neck CT w contrast    Narrative    EXAM: CT SOFT TISSUE NECK W CONTRAST  LOCATION: [facility]  DATE/TIME: 7/2/2022 5:22 PM    INDICATION: left tonsil swelling and left ear pain, left neck pain  COMPARISON: None.  CONTRAST: 90mL Isovue 370  TECHNIQUE: Routine CT Soft Tissue Neck with IV contrast. Multiplanar reformats. Dose reduction techniques were used.    FINDINGS:     MUCOSAL SPACES/SOFT TISSUES: Markedly enlarged oropharyngeal tonsils greater on the left. There is a left 2 x 2 cm peritonsillar abscess. Mild inflammatory soft tissue edema extends into the hypopharynx and supraglottic larynx inferiorly. Mild edema of   the left aryepiglottic fold. Normal vocal cords and infraglottic trachea. Mild reactive edema in the left parapharyngeal space. Normal oral cavity,  spaces,  and floor of mouth structures.    LYMPH NODES: No pathologic lymph nodes by size or morphology criteria.     SALIVARY GLANDS: Normal parotid and submandibular glands.    THYROID: Normal.     VESSELS: Vascular structures of the neck are patent.    VISUALIZED INTRACRANIAL/ORBITS/SINUSES: No abnormality of the visualized intracranial compartment or orbits. Moderate to severe bilateral ethmoid sinus opacification. Air-fluid levels in both maxillary sinuses.    OTHER: No destructive osseous lesion. The included lung apices are clear.      Impression    IMPRESSION:   1.  Markedly enlarged tonsils with a 2 x 2 centimeters peritonsillar abscess on the left.  2.  Laboratory changes extend inferiorly to involve the left side of the hypopharynx and supraglottic larynx consistent with supraglottitis. Mild thickening of the epiglottis on the left.  3.  There is moderate narrowing of the oropharyngeal airway.

## 2022-07-03 NOTE — DISCHARGE SUMMARY
"[facility] Discharge Summary          Mohinder Rodriguez MRN# [id number]   Age: 21 year old YOB: 2000     Date of Admission:  7/2/2022  Date of Discharge::  7/3/2022  Admitting Physician:  Sanjay Berry DO  Discharge Physician:  Julianna Powell PA-C  Primary Physician: No Ref-Primary, Physician     Primary Discharge Diagnoses:   Left Peritonsillar Abscess  Chronic Sinusitis     Secondary Discharge Diagnoses:   ADD     Hospital Course:   For detail history, please refer to H & P from 7/2/2022. \"In brief, this is a 21 year old male with past medical history of ADD who presented on 7/2/2022 with chief complaint of sore throat.  The patient states that he has been having issues with his sinuses for the past 3 months.  Approximately 2 weeks ago he developed a sore throat.  He went to the urgency room in [place] and tested positive for strep pharyngitis.  He was prescribed amoxicillin for 10 days and ketorolac.  He states his sore throat improved.  He finished his antibiotics and the sore throat returned approximately 2 to 3 days ago.  He has been not able to sleep secondary to the pain.  He reports a subjective fever.  This morning the pain was so persistent that he felt like his throat was closing so he came to the emergency department.     In the emergency department, the patient was found of a temperature of 99.0  F, heart rate 103, blood pressure 144/75, respiratory rate 18, SPO2 99% on room air.  Initial blood work revealed creatinine 1.03, total bilirubin 1.4, WBC 10.6.  The patient had a positive strep screen.  CT soft tissue of the neck showed markedly enlarged tonsils with a 2 x 2 centimeter peritonsillar abscess on the left with evidence of supraglottitis and mild thickening of the epiglottis on the left and moderate narrowing of the oropharyngeal airway.  ENT was contacted by the emergency department who recommended IV Unasyn, IV dexamethasone, and attempted drainage of the " "abscess in the emergency department.  Abscess cultures were obtained.\"    Patient was admitted to the observation unit.  He was started on IV Decadron and Unasyn.  His symptoms improved and he tolerated a regular diet.  ENT was consulted and patient was discharged home with family on augmentin 875 mg bid x 14 days, a prednisone taper (40 mg x 5 day, 30 mg x 5 days, 20 mg x 5 days, 10 mg x 5 days), daily nasal saline rinses and daily use of flonase with follow up in ENT clinic.  He can use OTC Tylenol and Ibuprofen for pain.  He was started on Protonix daily while on his steroid course.       Procedures/Imaging:     Results for orders placed or performed during the hospital encounter of 07/02/22   Soft tissue neck CT w contrast    Narrative    EXAM: CT SOFT TISSUE NECK W CONTRAST  LOCATION: [facility]  DATE/TIME: 7/2/2022 5:22 PM    INDICATION: left tonsil swelling and left ear pain, left neck pain  COMPARISON: None.  CONTRAST: 90mL Isovue 370  TECHNIQUE: Routine CT Soft Tissue Neck with IV contrast. Multiplanar reformats. Dose reduction techniques were used.    FINDINGS:     MUCOSAL SPACES/SOFT TISSUES: Markedly enlarged oropharyngeal tonsils greater on the left. There is a left 2 x 2 cm peritonsillar abscess. Mild inflammatory soft tissue edema extends into the hypopharynx and supraglottic larynx inferiorly. Mild edema of   the left aryepiglottic fold. Normal vocal cords and infraglottic trachea. Mild reactive edema in the left parapharyngeal space. Normal oral cavity,  spaces, and floor of mouth structures.    LYMPH NODES: No pathologic lymph nodes by size or morphology criteria.     SALIVARY GLANDS: Normal parotid and submandibular glands.    THYROID: Normal.     VESSELS: Vascular structures of the neck are patent.    VISUALIZED INTRACRANIAL/ORBITS/SINUSES: No abnormality of the visualized intracranial compartment or orbits. Moderate to severe bilateral ethmoid sinus " "opacification. Air-fluid levels in both maxillary sinuses.    OTHER: No destructive osseous lesion. The included lung apices are clear.      Impression    IMPRESSION:   1.  Markedly enlarged tonsils with a 2 x 2 centimeters peritonsillar abscess on the left.  2.  Laboratory changes extend inferiorly to involve the left side of the hypopharynx and supraglottic larynx consistent with supraglottitis. Mild thickening of the epiglottis on the left.  3.  There is moderate narrowing of the oropharyngeal airway.     Consultations:   ENT    Code Status:   Full    Allergies:   No Known Allergies     Subjective:   Patient reports still with a sore throat, but improved overall since admission.  Able to tolerated eating a sandwhich.    Physical Exam:   Blood pressure 114/58, pulse 74, temperature 98.1  F (36.7  C), temperature source Oral, resp. rate 18, height 1.753 m (5' 9\"), weight 86.7 kg (191 lb 3.2 oz), SpO2 96 %.  General: Alert, interactive, NAD, father at the bedside  HEENT: tolerating secretions, left tonisllar swelling.  Posterior pharynx and tonsillar erythema.  Resp: clear to auscultation bilaterally, no crackles or wheezes.  No labored breathing  Cardiac: regular rate and rhythm, no murmur  Abdomen: Soft, nontender, nondistended. +BS.  Skin: Warm and dry  Neuro: Alert & oriented,  moves all extremities equally   Discharge Medicatios:        Current Discharge Medication List      START taking these medications    Details   acetaminophen (TYLENOL) 325 MG tablet Take 2 tablets (650 mg) by mouth every 6 hours as needed for mild pain or other (and adjunct with moderate or severe pain or per patient request)    Associated Diagnoses: Peritonsillar abscess      amoxicillin-clavulanate (AUGMENTIN) 875-125 MG tablet Take 1 tablet by mouth 2 times daily  Qty: 28 tablet, Refills: 0    Associated Diagnoses: Peritonsillar abscess      fluticasone (FLONASE) 50 MCG/ACT nasal spray Spray 2 sprays into both nostrils daily for 14 " days  Qty: 16 g, Refills: 0    Associated Diagnoses: Peritonsillar abscess      ibuprofen (ADVIL/MOTRIN) 200 MG tablet Take 2 tablets (400 mg) by mouth every 6 hours as needed for moderate pain    Associated Diagnoses: Peritonsillar abscess      pantoprazole (PROTONIX) 40 MG EC tablet Take 1 tablet (40 mg) by mouth every morning (before breakfast) Use while you on are steroids  Qty: 20 tablet, Refills: 0    Associated Diagnoses: Peritonsillar abscess      predniSONE (DELTASONE) 10 MG tablet prednisone taper (40 mg x 5 days, 30 mg x 5 days, 20 mg x 5 days, 10 mg x 5 days),  Qty: 50 tablet, Refills: 0    Associated Diagnoses: Peritonsillar abscess         CONTINUE these medications which have NOT CHANGED    Details   ADDERALL XR 30 MG 24 hr capsule TAKE 1 CAPSULE (30 MG) BY MOUTH DAILY IN THE MORNING      amphetamine-dextroamphetamine (ADDERALL) 10 MG tablet Take 1 to 2 tablets (10-20 mg) by mouth as needed             Instructions Given to Patient as Discharge:     Discharge Procedure Orders   Reason for your hospital stay   Order Comments: You were hospitalized due to a left tonsil abscess which was drained.  You were treated with antibiotics and steroids with improvement     Follow-up and recommended labs and tests    Order Comments: Please complete the antibiotic (Augmentin 875 mg twice a day for 14 days)    Please complete a prednisone taper (40 mg x 5 day, 30 mg x 5 days, 20 mg x 5 days, 10 mg x 5 days),     Daily nasal saline rinses and daily use of flonase     You were started on Protonix daily which is for GI protection while on steroids.    Follow up in ENT clinic.  If not improved with maximal medical therapy, you may require a sinus surgery and tonsillectomy.     Activity   Order Comments: Your activity upon discharge: activity as tolerated     Order Specific Question Answer Comments   Is discharge order? Yes      Diet   Order Comments: Follow this diet upon discharge: regular diet with soft textured  foods.     Order Specific Question Answer Comments   Is discharge order? Yes        Pending Tests at Discharge:   Final abscess culture     Discharge Disposition:   Discharged to home     Julianna RICOC  Hospitalist Service  Pager [phone]    >30 minutes was spent in discharge planning, care coordination, physical examination and medication reconciliation on the date of discharge, 7/3/2022

## 2022-07-03 NOTE — CONSULTS
"[facility] Consultation by ENT Specialty Care    Mohinder Rodriguez MRN# [id number]   Age: 21 year old YOB: 2000     Date of Admission:  7/2/2022  Date of Consult:    07/03/22    Reason for consult: Peritonsillar abscess, chronic sinusitis       Requesting physician: Sanjay Berry DO                           Chief Complaint:   Chronic sinusitis, acute tonsillitis with left peritonsillar abscess            HPI:      HPI:  Mohinder Rodriguez is a 21 year old male with past medical history of ADD who reports chronic sinusitis with recurrent bronchitis over the past school year requiring approximately 5 courses of oral antibiotics.  He presented to the ED yesterday with an escalating sore throat localizing to the left with accompanying left otalgia.  He was found to have a left peritonsillar abscess which was needle aspirated in the ED and he was admitted to observation on unasyn and decadron.  He reports he is now able to eat solids and has no respiratory complaints.  He has not had a PTA in the past.    Previous tests and diagnostic procedures: see \"Tests and Procedures\" and \"PFSH\".               Past Medical History:   ADD            Past Surgical History:   No past surgical history on file.            Social History:   College student, football player            Family History:   No family history on file.            Immunizations:     There is no immunization history on file for this patient.            Allergies:   Patient has no known allergies.          Medications:     Current Facility-Administered Medications:      acetaminophen (TYLENOL) tablet 650 mg, 650 mg, Oral, Q6H PRN, 650 mg at 07/03/22 0731 **OR** acetaminophen (TYLENOL) Suppository 650 mg, 650 mg, Rectal, Q6H PRN, Sanjay Berry DO     amphetamine-dextroamphetamine (ADDERALL XR) ER cap 30 mg, 30 mg, Oral, QAM, Sanjay Berry DO     ampicillin-sulbactam (UNASYN) 3 g vial to attach to  mL bag, 3 g, " "Intravenous, Q6H, Sanjay Berry DO, 3 g at 07/03/22 0519     benzocaine 20% (HURRICAINE/TOPEX) 20 % spray 0.5-1 mL, 1-2 spray, Mouth/Throat, Q3H PRN, Sanjay Berry DO     benzocaine-menthol (CHLORASEPTIC) 6-10 MG lozenge 1 lozenge, 1 lozenge, Buccal, Q1H PRN, Sanjay Berry DO     camphor-eucalyptus-menthol (VICKS VAPORUB) 4.73-1.2-2.6 % ointment, , Topical, Daily PRN, Sanjay Berry DO     dexamethasone PF (DECADRON) injection 8 mg, 8 mg, Intravenous, Q8H, Sanjay Berry DO, 8 mg at 07/03/22 0731     diphenhydrAMINE (BENADRYL) capsule 25-50 mg, 25-50 mg, Oral, At Bedtime PRN **OR** diphenhydrAMINE (BENADRYL) injection 25 mg, 25 mg, Intravenous, Q6H PRN, Sanjay Berry DO     guaiFENesin (ROBITUSSIN) 20 mg/mL solution 10 mL, 10 mL, Oral, Q4H PRN, Sanjay Berry DO     ibuprofen (ADVIL/MOTRIN) tablet 200 mg, 200 mg, Oral, Q6H PRN, Sanjay Berry DO, 200 mg at 07/02/22 2240     melatonin tablet 1 mg, 1 mg, Oral, At Bedtime PRN, Sanjay Berry DO, 1 mg at 07/02/22 2347     ondansetron (ZOFRAN ODT) ODT tab 4 mg, 4 mg, Oral, Q6H PRN, 4 mg at 07/03/22 0731 **OR** ondansetron (ZOFRAN) injection 4 mg, 4 mg, Intravenous, Q6H PRN, Sanjay Berry DO     pantoprazole (PROTONIX) EC tablet 40 mg, 40 mg, Oral, QAM AC, Sanjay Berry DO, 40 mg at 07/03/22 0731     polyethylene glycol (MIRALAX) Packet 17 g, 17 g, Oral, Daily PRN, Sanjay Berry DO     senna-docusate (SENOKOT-S/PERICOLACE) 8.6-50 MG per tablet 1 tablet, 1 tablet, Oral, BID PRN **OR** senna-docusate (SENOKOT-S/PERICOLACE) 8.6-50 MG per tablet 2 tablet, 2 tablet, Oral, BID PRN, Sanjay Berry DO          Review of Systems:   Review Of Systems  Throat pain, nasal drainage, nasal congestion, facial pain/headache.          Physical exam:   CONSTITUTION:    /57 (BP Location: Left arm)   Pulse 68   Temp 97.8  F (36.6  C) (Oral)   Resp 18   Ht 1.753 m (5' 9\")   Wt 86.7 kg " (191 lb 3.2 oz)   SpO2 98%   BMI 28.24 kg/m       General appearance:Well developed, well nourished and groomed. No apparent acute or chronic distress.    Respirations quiet.  Voice normal in quality (no hot potato quality).  Able to swallow own secretions without evidence of difficulty.  OC/OP:  No trismus.  Pharynx/tonsils mildly erythematous.  Left peritonsillar soft tissue diffusely full but without evidence of further localized abscess  Nares:  Clear anteriorly         Data:     Results for orders placed or performed during the hospital encounter of 07/02/22   Soft tissue neck CT w contrast     Status: None    Narrative    EXAM: CT SOFT TISSUE NECK W CONTRAST  LOCATION: [facility]  DATE/TIME: 7/2/2022 5:22 PM    INDICATION: left tonsil swelling and left ear pain, left neck pain  COMPARISON: None.  CONTRAST: 90mL Isovue 370  TECHNIQUE: Routine CT Soft Tissue Neck with IV contrast. Multiplanar reformats. Dose reduction techniques were used.    FINDINGS:     MUCOSAL SPACES/SOFT TISSUES: Markedly enlarged oropharyngeal tonsils greater on the left. There is a left 2 x 2 cm peritonsillar abscess. Mild inflammatory soft tissue edema extends into the hypopharynx and supraglottic larynx inferiorly. Mild edema of   the left aryepiglottic fold. Normal vocal cords and infraglottic trachea. Mild reactive edema in the left parapharyngeal space. Normal oral cavity,  spaces, and floor of mouth structures.    LYMPH NODES: No pathologic lymph nodes by size or morphology criteria.     SALIVARY GLANDS: Normal parotid and submandibular glands.    THYROID: Normal.     VESSELS: Vascular structures of the neck are patent.    VISUALIZED INTRACRANIAL/ORBITS/SINUSES: No abnormality of the visualized intracranial compartment or orbits. Moderate to severe bilateral ethmoid sinus opacification. Air-fluid levels in both maxillary sinuses.    OTHER: No destructive osseous lesion. The included lung apices are  clear.      Impression    IMPRESSION:   1.  Markedly enlarged tonsils with a 2 x 2 centimeters peritonsillar abscess on the left.  2.  Laboratory changes extend inferiorly to involve the left side of the hypopharynx and supraglottic larynx consistent with supraglottitis. Mild thickening of the epiglottis on the left.  3.  There is moderate narrowing of the oropharyngeal airway.   Comprehensive metabolic panel     Status: Abnormal   Result Value Ref Range    Sodium 134 133 - 144 mmol/L    Potassium 3.7 3.4 - 5.3 mmol/L    Chloride 102 98 - 110 mmol/L    Carbon Dioxide (CO2) 28 20 - 32 mmol/L    Anion Gap 4 3 - 14 mmol/L    Urea Nitrogen 12 7 - 30 mg/dL    Creatinine 1.03 (H) 0.50 - 1.00 mg/dL    Calcium 9.5 8.5 - 10.1 mg/dL    Glucose 88 70 - 99 mg/dL    Alkaline Phosphatase 78 65 - 260 U/L    AST 15 0 - 35 U/L    ALT 25 0 - 50 U/L    Protein Total 7.9 6.8 - 8.8 g/dL    Albumin 3.9 3.4 - 5.0 g/dL    Bilirubin Total 1.4 (H) 0.2 - 1.3 mg/dL    GFR Estimate >90 >60 mL/min/1.73m2   Mononucleosis screen     Status: Normal   Result Value Ref Range    Mononucleosis Screen Negative Negative   Symptomatic; Unknown Influenza A/B & SARS-CoV2 (COVID-19) Virus PCR Multiplex Nasopharyngeal     Status: Normal    Specimen: Nasopharyngeal; Swab   Result Value Ref Range    Influenza A PCR Negative Negative    Influenza B PCR Negative Negative    RSV PCR Negative Negative    SARS CoV2 PCR Negative Negative    Narrative    Testing was performed using the Xpert Xpress CoV2/Flu/RSV Assay on the Cepheid GeneXpert Instrument. This test should be ordered for the detection of SARS-CoV-2 and influenza viruses in individuals who meet clinical and/or epidemiological criteria. Test performance is unknown in asymptomatic patients. This test is for in vitro diagnostic use under the FDA EUA for laboratories certified under CLIA to perform high or moderate complexity testing. This test has not been FDA cleared or approved. A negative result does not  rule out the presence of PCR inhibitors in the specimen or target RNA in concentration below the limit of detection for the assay. If only one viral target is positive but coinfection with multiple targets is suspected, the sample should be re-tested with another FDA cleared, approved, or authorized test, if coinfection would change clinical management. This test was validated by the [facility] and [facility]. These laboratories are certified under the Clinical  Laboratory Improvement Amendments of 1988 (CLIA-88) as qualified to perform high complexity laboratory testing.   CBC with platelets and differential     Status: Abnormal   Result Value Ref Range    WBC Count 10.6 4.0 - 11.0 10e3/uL    RBC Count 4.91 4.40 - 5.90 10e6/uL    Hemoglobin 14.5 13.3 - 17.7 g/dL    Hematocrit 42.8 40.0 - 53.0 %    MCV 87 78 - 100 fL    MCH 29.5 26.5 - 33.0 pg    MCHC 33.9 31.5 - 36.5 g/dL    RDW 11.6 10.0 - 15.0 %    Platelet Count 297 150 - 450 10e3/uL    % Neutrophils 80 %    % Lymphocytes 9 %    % Monocytes 10 %    % Eosinophils 1 %    % Basophils 0 %    % Immature Granulocytes 0 %    NRBCs per 100 WBC 0 <1 /100    Absolute Neutrophils 8.5 (H) 1.6 - 8.3 10e3/uL    Absolute Lymphocytes 0.9 0.8 - 5.3 10e3/uL    Absolute Monocytes 1.0 0.0 - 1.3 10e3/uL    Absolute Eosinophils 0.1 0.0 - 0.7 10e3/uL    Absolute Basophils 0.0 0.0 - 0.2 10e3/uL    Absolute Immature Granulocytes 0.0 <=0.4 10e3/uL    Absolute NRBCs 0.0 10e3/uL   Basic metabolic panel     Status: Abnormal   Result Value Ref Range    Sodium 136 133 - 144 mmol/L    Potassium 4.3 3.4 - 5.3 mmol/L    Chloride 101 98 - 110 mmol/L    Carbon Dioxide (CO2) 29 20 - 32 mmol/L    Anion Gap 6 3 - 14 mmol/L    Urea Nitrogen 15 7 - 30 mg/dL    Creatinine 0.95 0.50 - 1.00 mg/dL    Calcium 10.1 8.5 - 10.1 mg/dL    Glucose 143 (H) 70 - 99 mg/dL    GFR Estimate >90 >60 mL/min/1.73m2   CBC with platelets     Status: Normal   Result Value Ref Range    WBC Count 10.1 4.0 - 11.0 10e3/uL     RBC Count 4.96 4.40 - 5.90 10e6/uL    Hemoglobin 14.5 13.3 - 17.7 g/dL    Hematocrit 43.3 40.0 - 53.0 %    MCV 87 78 - 100 fL    MCH 29.2 26.5 - 33.0 pg    MCHC 33.5 31.5 - 36.5 g/dL    RDW 11.4 10.0 - 15.0 %    Platelet Count 323 150 - 450 10e3/uL   Streptococcus A Rapid Screen w/Reflex to PCR     Status: Abnormal    Specimen: Throat; Swab   Result Value Ref Range    Group A Strep antigen Positive (A) Negative   CBC with platelets differential     Status: Abnormal    Narrative    The following orders were created for panel order CBC with platelets differential.  Procedure                               Abnormality         Status                     ---------                               -----------         ------                     CBC with platelets and d...[[id number]]  Abnormal            Final result                 Please view results for these tests on the individual orders.        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  Strep test positive.  On unasyn and decadron. His throat pain is feeling improved.  In addition to his peritonsillar abscess, his CT shows evidence of marked sinusitis.  This was discussed with the patient.  When ready for discharge,  I would recommended augmentin 875 mg bid x 14 days, a prednisone taper (40 mg x 5 day, 30 mg x 5 days, 20 mg x 5 days, 10 mg x 5 days), daily nasal saline rinses and daily use of flonase (or other nasal steroid spray) with follow up in ENT clinic.  If  Not improved with maximal medical therapy, we discussed he may require a sinus surgery.  We also discussed he may require a tonsillectomy should he go on to develop further recurrent tonsillitis or have another peritonsillar abscess.    Attestation:  I have reviewed today's vital signs,  notes, medications, medication allergies, and PFSH/ROSlabs and imaging.    Lauren Cruz MD

## 2022-07-05 ENCOUNTER — PATIENT OUTREACH (OUTPATIENT)
Dept: CARE COORDINATION | Facility: CLINIC | Age: 22
End: 2022-07-05

## 2022-07-05 DIAGNOSIS — Z71.89 OTHER SPECIFIED COUNSELING: ICD-10-CM

## 2022-07-05 NOTE — PROGRESS NOTES
"Per patient and per AVS, patient was told to follow-up with an ENT provider. Patient is wondering if a referral has been placed to schedule a ENT follow-up appointment, as he has not heard from anyone about scheduling a follow-up at the ENT clinic. Please contact patient at: [phone]. Please advise.     Clinic Care Coordination Contact  [facility]: Post-Discharge Note  SITUATION                                                      Admission:    Admission Date: 07/02/22   Reason for Admission: Sore Throat  Discharge:   Discharge Date: 07/03/22  Discharge Diagnosis: Left Peritonsillar Abscess, Chronic Sinusitis    BACKGROUND                                                      Per hospital discharge summary and inpatient provider notes:    Chief Complaint:  Multiple Complaints     The history is provided by the patient and a parent.      Mohinder Rodriguez is a 21 year old male who presents with 4 months of ongoing illness including sinus infections, bronchitis, and strep. He has been experiencing ongoing sore throat, ear pain, headaches, cough, congestion, neck pain, unintentional weight loss, and decreased appetite and fluid intake. He has not taken any oral analgesics today. He states that he experiences chest pain during hard coughing episodes. He denies fever, abdominal pain, dysuria, frequency, vomiting, or shortness of breath.     His mother notes that he may have had COVID-19 while at college, although he tested negative. During this time, he lost his ability to taste.    Hospital Course:   For detail history, please refer to H & P from 7/2/2022. \"In brief, this is a 21 year old male with past medical history of ADD who presented on 7/2/2022 with chief complaint of sore throat.  The patient states that he has been having issues with his sinuses for the past 3 months.  Approximately 2 weeks ago he developed a sore throat.  He went to the urgency room in [place] and tested positive for strep pharyngitis. " " He was prescribed amoxicillin for 10 days and ketorolac.  He states his sore throat improved.  He finished his antibiotics and the sore throat returned approximately 2 to 3 days ago.  He has been not able to sleep secondary to the pain.  He reports a subjective fever.  This morning the pain was so persistent that he felt like his throat was closing so he came to the emergency department.     In the emergency department, the patient was found of a temperature of 99.0  F, heart rate 103, blood pressure 144/75, respiratory rate 18, SPO2 99% on room air.  Initial blood work revealed creatinine 1.03, total bilirubin 1.4, WBC 10.6.  The patient had a positive strep screen.  CT soft tissue of the neck showed markedly enlarged tonsils with a 2 x 2 centimeter peritonsillar abscess on the left with evidence of supraglottitis and mild thickening of the epiglottis on the left and moderate narrowing of the oropharyngeal airway.  ENT was contacted by the emergency department who recommended IV Unasyn, IV dexamethasone, and attempted drainage of the abscess in the emergency department.  Abscess cultures were obtained.\"     Patient was admitted to the observation unit.  He was started on IV Decadron and Unasyn.  His symptoms improved and he tolerated a regular diet.  ENT was consulted and patient was discharged home with family on augmentin 875 mg bid x 14 days, a prednisone taper (40 mg x 5 day, 30 mg x 5 days, 20 mg x 5 days, 10 mg x 5 days), daily nasal saline rinses and daily use of flonase with follow up in ENT clinic.  He can use OTC Tylenol and Ibuprofen for pain.  He was started on Protonix daily while on his steroid course.       ASSESSMENT      Enrollment  Primary Care Care Coordination Status: Not a Candidate    Discharge Assessment  How are you doing now that you are home?: \"Yes. Oh yeah...yesterday was good once I got home. This morning my throat hurt again, but I took my medications and I'm doing better now.\"  How " are your symptoms? (Red Flag symptoms escalate to triage hotline per guidelines): Improved  Do you feel your condition is stable enough to be safe at home until your provider visit?: Yes  Does the patient have their discharge instructions? : Yes  Does the patient have questions regarding their discharge instructions? : No  Were you started on any new medications or were there changes to any of your previous medications? : Yes  Does the patient have all of their medications?: Yes  Do you have questions regarding any of your medications? : No  Do you have all of your needed medical supplies or equipment (DME)?  (i.e. oxygen tank, CPAP, cane, etc.): Yes  Discharge follow-up appointment scheduled within 14 calendar days? : No (Per AVS and per patient, he was told to f/u with and ENT provider, but has not heard from them. BRITTANIE was route a message to see if a referral was placed for an ENT f/u appt.)  Is patient agreeable to assistance with scheduling? : Yes    Post-op (BRITTANIE CTA Only)  If the patient had a surgery or procedure, do they have any questions for a nurse?: No      PLAN                                                      Outpatient Plan:      Follow-up and recommended labs and tests  Please complete the antibiotic (Augmentin 875 mg twice a day for 14  Days)    Please complete a prednisone taper (40 mg x 5 day, 30 mg x 5 days, 20  mg x 5 days, 10 mg x 5 days),    Daily nasal saline rinses and daily use of flonase    You were started on Protonix daily which is for GI protection while on Steroids.    Follow up in ENT clinic. If not improved with maximal medical therapy, you may require a sinus surgery and tonsillectomy.    No future appointments.      For any urgent concerns, please contact our 24 hour nurse triage line: [phone])         BRITTANIE Castaneda  [phone]  [facility]

## 2022-07-06 LAB — BACTERIA ABSC ANAEROBE+AEROBE CULT: NORMAL

## 2023-08-09 ENCOUNTER — OFFICE VISIT (OUTPATIENT)
Dept: PEDIATRICS | Facility: CLINIC | Age: 23
End: 2023-08-09
Payer: COMMERCIAL

## 2023-08-09 VITALS
SYSTOLIC BLOOD PRESSURE: 112 MMHG | DIASTOLIC BLOOD PRESSURE: 70 MMHG | RESPIRATION RATE: 14 BRPM | WEIGHT: 182.8 LBS | HEIGHT: 70 IN | OXYGEN SATURATION: 97 % | BODY MASS INDEX: 26.17 KG/M2 | HEART RATE: 60 BPM | TEMPERATURE: 98.3 F

## 2023-08-09 DIAGNOSIS — F90.9 ATTENTION DEFICIT HYPERACTIVITY DISORDER (ADHD), UNSPECIFIED ADHD TYPE: ICD-10-CM

## 2023-08-09 DIAGNOSIS — Z00.00 ROUTINE GENERAL MEDICAL EXAMINATION AT A HEALTH CARE FACILITY: Primary | ICD-10-CM

## 2023-08-09 PROBLEM — J36 PERITONSILLAR ABSCESS: Status: RESOLVED | Noted: 2022-07-02 | Resolved: 2023-08-09

## 2023-08-09 PROCEDURE — 90471 IMMUNIZATION ADMIN: CPT | Performed by: INTERNAL MEDICINE

## 2023-08-09 PROCEDURE — 99385 PREV VISIT NEW AGE 18-39: CPT | Mod: 25 | Performed by: INTERNAL MEDICINE

## 2023-08-09 PROCEDURE — 90715 TDAP VACCINE 7 YRS/> IM: CPT | Performed by: INTERNAL MEDICINE

## 2023-08-09 RX ORDER — FEXOFENADINE HCL 180 MG/1
180 TABLET ORAL DAILY
COMMUNITY

## 2023-08-09 RX ORDER — FLUTICASONE PROPIONATE 50 MCG
1 SPRAY, SUSPENSION (ML) NASAL DAILY
COMMUNITY

## 2023-08-09 SDOH — ECONOMIC STABILITY: FOOD INSECURITY: WITHIN THE PAST 12 MONTHS, YOU WORRIED THAT YOUR FOOD WOULD RUN OUT BEFORE YOU GOT MONEY TO BUY MORE.: NEVER TRUE

## 2023-08-09 SDOH — ECONOMIC STABILITY: INCOME INSECURITY: IN THE LAST 12 MONTHS, WAS THERE A TIME WHEN YOU WERE NOT ABLE TO PAY THE MORTGAGE OR RENT ON TIME?: NO

## 2023-08-09 SDOH — ECONOMIC STABILITY: INCOME INSECURITY: HOW HARD IS IT FOR YOU TO PAY FOR THE VERY BASICS LIKE FOOD, HOUSING, MEDICAL CARE, AND HEATING?: NOT VERY HARD

## 2023-08-09 SDOH — ECONOMIC STABILITY: TRANSPORTATION INSECURITY
IN THE PAST 12 MONTHS, HAS LACK OF TRANSPORTATION KEPT YOU FROM MEETINGS, WORK, OR FROM GETTING THINGS NEEDED FOR DAILY LIVING?: NO

## 2023-08-09 SDOH — HEALTH STABILITY: PHYSICAL HEALTH: ON AVERAGE, HOW MANY DAYS PER WEEK DO YOU ENGAGE IN MODERATE TO STRENUOUS EXERCISE (LIKE A BRISK WALK)?: 7 DAYS

## 2023-08-09 SDOH — ECONOMIC STABILITY: FOOD INSECURITY: WITHIN THE PAST 12 MONTHS, THE FOOD YOU BOUGHT JUST DIDN'T LAST AND YOU DIDN'T HAVE MONEY TO GET MORE.: NEVER TRUE

## 2023-08-09 SDOH — HEALTH STABILITY: PHYSICAL HEALTH: ON AVERAGE, HOW MANY MINUTES DO YOU ENGAGE IN EXERCISE AT THIS LEVEL?: 60 MIN

## 2023-08-09 SDOH — ECONOMIC STABILITY: TRANSPORTATION INSECURITY
IN THE PAST 12 MONTHS, HAS THE LACK OF TRANSPORTATION KEPT YOU FROM MEDICAL APPOINTMENTS OR FROM GETTING MEDICATIONS?: NO

## 2023-08-09 ASSESSMENT — ENCOUNTER SYMPTOMS
CHILLS: 0
HEADACHES: 0
HEMATURIA: 0
DIARRHEA: 0
ARTHRALGIAS: 0
CONSTIPATION: 0
COUGH: 0
EYE PAIN: 0
NERVOUS/ANXIOUS: 0
DIZZINESS: 0
DYSURIA: 0
SHORTNESS OF BREATH: 0
MYALGIAS: 0
WEAKNESS: 0
HEMATOCHEZIA: 0
PALPITATIONS: 0
PARESTHESIAS: 0
FREQUENCY: 0
HEARTBURN: 0
NAUSEA: 0
JOINT SWELLING: 0
FEVER: 0
SORE THROAT: 0
ABDOMINAL PAIN: 0

## 2023-08-09 ASSESSMENT — SOCIAL DETERMINANTS OF HEALTH (SDOH)
HOW OFTEN DO YOU ATTEND CHURCH OR RELIGIOUS SERVICES?: MORE THAN 4 TIMES PER YEAR
HOW OFTEN DO YOU GET TOGETHER WITH FRIENDS OR RELATIVES?: MORE THAN THREE TIMES A WEEK
ARE YOU MARRIED, WIDOWED, DIVORCED, SEPARATED, NEVER MARRIED, OR LIVING WITH A PARTNER?: NEVER MARRIED
DO YOU BELONG TO ANY CLUBS OR ORGANIZATIONS SUCH AS CHURCH GROUPS UNIONS, FRATERNAL OR ATHLETIC GROUPS, OR SCHOOL GROUPS?: NO
IN A TYPICAL WEEK, HOW MANY TIMES DO YOU TALK ON THE PHONE WITH FAMILY, FRIENDS, OR NEIGHBORS?: MORE THAN THREE TIMES A WEEK

## 2023-08-09 ASSESSMENT — LIFESTYLE VARIABLES
HOW OFTEN DO YOU HAVE A DRINK CONTAINING ALCOHOL: NEVER
HOW OFTEN DO YOU HAVE SIX OR MORE DRINKS ON ONE OCCASION: NEVER
HOW MANY STANDARD DRINKS CONTAINING ALCOHOL DO YOU HAVE ON A TYPICAL DAY: PATIENT DOES NOT DRINK
SKIP TO QUESTIONS 9-10: 1
AUDIT-C TOTAL SCORE: 0

## 2023-08-09 NOTE — PROGRESS NOTES
SUBJECTIVE:   CC: Mohinder is an 22 year old who presents for preventative health visit.       8/9/2023     8:27 AM   Additional Questions   Roomed by Enma Baez CMA   Accompanied by JUVE       Healthy Habits:     Getting at least 3 servings of Calcium per day:  Yes    Bi-annual eye exam:  Yes    Dental care twice a year:  Yes    Sleep apnea or symptoms of sleep apnea:  None    Diet:  Other    Frequency of exercise:  6-7 days/week    Duration of exercise:  Greater than 60 minutes    Taking medications regularly:  Yes    Medication side effects:  None    Additional concerns today:  No    # ADHD - Monica Sen NP, annual  - Diagnosed in elementary school   - Adderall XR 30, 10 IR if needed  - stable for a long time.     Today's PHQ-2 Score:       8/9/2023     8:22 AM   PHQ-2 ( 1999 [employer])   Q1: Little interest or pleasure in doing things 0   Q2: Feeling down, depressed or hopeless 0   PHQ-2 Score 0   Q1: Little interest or pleasure in doing things Not at all   Q2: Feeling down, depressed or hopeless Not at all   PHQ-2 Score 0       Have you ever done Advance Care Planning? (For example, a Health Directive, POLST, or a discussion with a medical provider or your loved ones about your wishes): No, advance care planning information given to patient to review.  Patient plans to discuss their wishes with loved ones or provider.      Social History     Tobacco Use    Smoking status: Never    Smokeless tobacco: Never   Substance Use Topics    Alcohol use: Not on file             8/9/2023     8:21 AM   Alcohol Use   Prescreen: >3 drinks/day or >7 drinks/week? Not Applicable     Last PSA: No results found for: PSA    Reviewed orders with patient. Reviewed health maintenance and updated orders accordingly - Yes    Reviewed and updated as needed this visit by clinical staff   Tobacco  Allergies  Meds  Problems    Fam Hx          Reviewed and updated as needed this visit by Provider     Meds  Problems    Fam Hx            Review of Systems   Constitutional:  Negative for chills and fever.   HENT:  Negative for congestion, ear pain, hearing loss and sore throat.    Eyes:  Negative for pain and visual disturbance.   Respiratory:  Negative for cough and shortness of breath.    Cardiovascular:  Negative for chest pain, palpitations and peripheral edema.   Gastrointestinal:  Negative for abdominal pain, constipation, diarrhea, heartburn, hematochezia and nausea.   Genitourinary:  Negative for dysuria, frequency, genital sores, hematuria, impotence, penile discharge and urgency.   Musculoskeletal:  Negative for arthralgias, joint swelling and myalgias.   Skin:  Negative for rash.   Neurological:  Negative for dizziness, weakness, headaches and paresthesias.   Psychiatric/Behavioral:  Negative for mood changes. The patient is not nervous/anxious.            2023     8:56 AM   [place] [employer] Physical   Do you have any concerns that you would like to discuss with your provider? No   Has a provider ever denied or restricted your participation in sports for any reason? No   Do you have any ongoing medical issues or recent illness? No   Have you ever passed out or nearly passed out during or after exercise? No   Have you ever had discomfort, pain, tightness, or pressure in your chest during exercise? No   Does your heart ever race, flutter in your chest, or skip beats (irregular beats) during exercise? No   Has a doctor ever told you that you have any heart problems? No   Has a doctor ever requested a test for your heart? For example, electrocardiography (ECG) or echocardiography. No   Do you ever get light-headed or feel shorter of breath than your friends during exercise?  No   Have you ever had a seizure?  No   Has any family member or relative  of heart problems or had an unexpected or unexplained sudden death before age 35 years (including drowning or unexplained car crash)? No   Does anyone in your family have a  "genetic heart problem such as hypertrophic cardiomyopathy (HCM), Marfan syndrome, arrhythmogenic right ventricular cardiomyopathy (ARVC), long QT syndrome (LQTS), short QT syndrome (SQTS), Brugada syndrome, or catecholaminergic polymorphic ventricular tachycardia (CPVT)?   No   Has anyone in your family had a pacemaker or an implanted defibrillator before age 35? No   Have you ever had a stress fracture or an injury to a bone, muscle, ligament, joint, or tendon that caused you to miss a practice or game? (!) YES   Do you have a bone, muscle, ligament, or joint injury that bothers you?  No   Do you cough, wheeze, or have difficulty breathing during or after exercise?   No   Are you missing a kidney, an eye, a testicle (males), your spleen, or any other organ? No   Do you have groin or testicle pain or a painful bulge or hernia in the groin area? No   Do you have any recurring skin rashes or rashes that come and go, including herpes or methicillin-resistant Staphylococcus aureus (MRSA)? No   Have you had a concussion or head injury that caused confusion, a prolonged headache, or memory problems? No   Have you ever had numbness, tingling, weakness in your arms or legs, or been unable to move your arms or legs after being hit or falling? No   Have you ever become ill while exercising in the heat? No   Do you or does someone in your family have sickle cell trait or disease? No   Have you ever had, or do you have any problems with your eyes or vision? No   Do you worry about your weight? No   Are you trying to or has anyone recommended that you gain or lose weight? No   Are you on a special diet or do you avoid certain types of foods or food groups? No   Have you ever had an eating disorder? No        OBJECTIVE:   /70 (BP Location: Right arm, Patient Position: Sitting, Cuff Size: Adult Regular)   Pulse 60   Temp 98.3  F (36.8  C) (Tympanic)   Resp 14   Ht 1.766 m (5' 9.53\")   Wt 82.9 kg (182 lb 12.8 oz)   SpO2 " 97%   BMI 26.59 kg/m      Physical Exam  GENERAL: healthy, alert and no distress  EYES: Eyes grossly normal to inspection, PERRL and conjunctivae and sclerae normal  HENT: ear canals and TM's normal, nose and mouth without ulcers or lesions  NECK: no adenopathy, no asymmetry, masses, or scars and thyroid normal to palpation  RESP: lungs clear to auscultation - no rales, rhonchi or wheezes  CV: regular rate and rhythm, normal S1 S2, no S3 or S4, no murmur, click or rub, no peripheral edema and peripheral pulses strong  ABDOMEN: soft, nontender, no hepatosplenomegaly, no masses and bowel sounds normal  MS: no gross musculoskeletal defects noted, no edema  : Normal penis, testes  SKIN: no suspicious lesions or rashes  NEURO: Normal strength and tone, mentation intact and speech normal  PSYCH: mentation appears normal, affect normal/bright  Normal crab walk, normal joints, no murmur when squatting or standing    ASSESSMENT/PLAN:       ICD-10-CM    1. Routine general medical examination at a health care facility  Z00.00       2. Attention deficit hyperactivity disorder (ADHD), unspecified ADHD type  F90.9         Doing great at school. Following annualy with psych for ADHD.    COUNSELING:   Reviewed preventive health counseling, as reflected in patient instructions    He reports that he has never smoked. He has never used smokeless tobacco.            Urbano Monson MD  [facility]

## 2023-08-09 NOTE — LETTER
SPORTS CLEARANCE     Mohinder Rodriguez    Telephone: [phone] (home)  [address] [address]  YOB: 2000   22 year old male      I certify that the above student has been medically evaluated and is deemed to be physically fit to participate in school interscholastic activities as indicated below.    Participation Clearance For:   Collision Sports, YES  Limited Contact Sports, YES  Noncontact Sports, YES      Immunizations up to date: Yes     Date of physical exam: 8/9/23        _______________________________________________  Attending Provider Signature     8/10/2023      Urbano Monson MD      Valid for 3 years from above date with a normal Annual Health Questionnaire (all NO responses)     Year 2     Year 3      A sports clearance letter meets the [facility] requirements for sports participation.  If there are concerns about this policy please call [facility] administration office directly at [phone].

## 2024-02-13 ENCOUNTER — ANESTHESIA EVENT (OUTPATIENT)
Dept: SURGERY | Facility: CLINIC | Age: 24
End: 2024-02-13
Payer: COMMERCIAL

## 2024-02-13 ENCOUNTER — HOSPITAL ENCOUNTER (OUTPATIENT)
Facility: CLINIC | Age: 24
Discharge: HOME | End: 2024-02-13
Attending: STUDENT IN AN ORGANIZED HEALTH CARE EDUCATION/TRAINING PROGRAM | Admitting: STUDENT IN AN ORGANIZED HEALTH CARE EDUCATION/TRAINING PROGRAM
Payer: COMMERCIAL

## 2024-02-13 ENCOUNTER — ANESTHESIA (OUTPATIENT)
Dept: SURGERY | Facility: CLINIC | Age: 24
End: 2024-02-13
Payer: COMMERCIAL

## 2024-02-13 VITALS
HEART RATE: 78 BPM | TEMPERATURE: 97.1 F | RESPIRATION RATE: 16 BRPM | OXYGEN SATURATION: 97 % | HEIGHT: 70 IN | DIASTOLIC BLOOD PRESSURE: 86 MMHG | SYSTOLIC BLOOD PRESSURE: 104 MMHG | BODY MASS INDEX: 26.14 KG/M2 | WEIGHT: 182.6 LBS

## 2024-02-13 DIAGNOSIS — S43.432A TEAR OF LEFT GLENOID LABRUM, INITIAL ENCOUNTER: Primary | ICD-10-CM

## 2024-02-13 PROCEDURE — 271N000001 HC OR GENERAL SUPPLY NON-STERILE: Performed by: STUDENT IN AN ORGANIZED HEALTH CARE EDUCATION/TRAINING PROGRAM

## 2024-02-13 PROCEDURE — 250N000009 HC RX 250: Performed by: NURSE ANESTHETIST, CERTIFIED REGISTERED

## 2024-02-13 PROCEDURE — C1713 ANCHOR/SCREW BN/BN,TIS/BN: HCPCS | Performed by: STUDENT IN AN ORGANIZED HEALTH CARE EDUCATION/TRAINING PROGRAM

## 2024-02-13 PROCEDURE — 370N000017 HC ANESTHESIA TECHNICAL FEE, PER MIN: Performed by: STUDENT IN AN ORGANIZED HEALTH CARE EDUCATION/TRAINING PROGRAM

## 2024-02-13 PROCEDURE — 710N000009 HC RECOVERY PHASE 1, LEVEL 1, PER MIN: Performed by: STUDENT IN AN ORGANIZED HEALTH CARE EDUCATION/TRAINING PROGRAM

## 2024-02-13 PROCEDURE — 250N000011 HC RX IP 250 OP 636

## 2024-02-13 PROCEDURE — 710N000012 HC RECOVERY PHASE 2, PER MINUTE: Performed by: STUDENT IN AN ORGANIZED HEALTH CARE EDUCATION/TRAINING PROGRAM

## 2024-02-13 PROCEDURE — 258N000003 HC RX IP 258 OP 636: Performed by: NURSE ANESTHETIST, CERTIFIED REGISTERED

## 2024-02-13 PROCEDURE — 250N000011 HC RX IP 250 OP 636: Performed by: NURSE ANESTHETIST, CERTIFIED REGISTERED

## 2024-02-13 PROCEDURE — 250N000009 HC RX 250

## 2024-02-13 PROCEDURE — 258N000001 HC RX 258: Performed by: STUDENT IN AN ORGANIZED HEALTH CARE EDUCATION/TRAINING PROGRAM

## 2024-02-13 PROCEDURE — 272N000001 HC OR GENERAL SUPPLY STERILE: Performed by: STUDENT IN AN ORGANIZED HEALTH CARE EDUCATION/TRAINING PROGRAM

## 2024-02-13 PROCEDURE — 999N000141 HC STATISTIC PRE-PROCEDURE NURSING ASSESSMENT: Performed by: STUDENT IN AN ORGANIZED HEALTH CARE EDUCATION/TRAINING PROGRAM

## 2024-02-13 PROCEDURE — 258N000003 HC RX IP 258 OP 636: Performed by: ANESTHESIOLOGY

## 2024-02-13 PROCEDURE — 360N000077 HC SURGERY LEVEL 4, PER MIN: Performed by: STUDENT IN AN ORGANIZED HEALTH CARE EDUCATION/TRAINING PROGRAM

## 2024-02-13 DEVICE — FIBERTAK DISPOSABLES CURVED KIT
Type: IMPLANTABLE DEVICE | Site: SHOULDER | Status: FUNCTIONAL
Brand: ARTHREX®

## 2024-02-13 DEVICE — KL 1.8 FIBERTAK, SHOULDER
Type: IMPLANTABLE DEVICE | Site: SHOULDER | Status: FUNCTIONAL
Brand: ARTHREX®

## 2024-02-13 RX ORDER — DEXAMETHASONE SODIUM PHOSPHATE 4 MG/ML
INJECTION, SOLUTION INTRA-ARTICULAR; INTRALESIONAL; INTRAMUSCULAR; INTRAVENOUS; SOFT TISSUE PRN
Status: DISCONTINUED | OUTPATIENT
Start: 2024-02-13 | End: 2024-02-13

## 2024-02-13 RX ORDER — CEFAZOLIN SODIUM/WATER 2 G/20 ML
2 SYRINGE (ML) INTRAVENOUS SEE ADMIN INSTRUCTIONS
Status: DISCONTINUED | OUTPATIENT
Start: 2024-02-13 | End: 2024-02-13 | Stop reason: HOSPADM

## 2024-02-13 RX ORDER — OXYCODONE HYDROCHLORIDE 5 MG/1
5 TABLET ORAL
Status: DISCONTINUED | OUTPATIENT
Start: 2024-02-13 | End: 2024-02-13 | Stop reason: HOSPADM

## 2024-02-13 RX ORDER — FENTANYL CITRATE 50 UG/ML
INJECTION, SOLUTION INTRAMUSCULAR; INTRAVENOUS PRN
Status: DISCONTINUED | OUTPATIENT
Start: 2024-02-13 | End: 2024-02-13

## 2024-02-13 RX ORDER — CEFAZOLIN SODIUM/WATER 2 G/20 ML
2 SYRINGE (ML) INTRAVENOUS
Status: COMPLETED | OUTPATIENT
Start: 2024-02-13 | End: 2024-02-13

## 2024-02-13 RX ORDER — SODIUM CHLORIDE, SODIUM LACTATE, POTASSIUM CHLORIDE, CALCIUM CHLORIDE 600; 310; 30; 20 MG/100ML; MG/100ML; MG/100ML; MG/100ML
INJECTION, SOLUTION INTRAVENOUS CONTINUOUS
Status: DISCONTINUED | OUTPATIENT
Start: 2024-02-13 | End: 2024-02-13 | Stop reason: HOSPADM

## 2024-02-13 RX ORDER — PROPOFOL 10 MG/ML
INJECTION, EMULSION INTRAVENOUS CONTINUOUS PRN
Status: DISCONTINUED | OUTPATIENT
Start: 2024-02-13 | End: 2024-02-13

## 2024-02-13 RX ORDER — IBUPROFEN 600 MG/1
600 TABLET, FILM COATED ORAL EVERY 6 HOURS PRN
Qty: 40 TABLET | Refills: 0 | Status: SHIPPED | OUTPATIENT
Start: 2024-02-13

## 2024-02-13 RX ORDER — METHOCARBAMOL 750 MG/1
750 TABLET, FILM COATED ORAL 4 TIMES DAILY PRN
Qty: 25 TABLET | Refills: 0 | Status: SHIPPED | OUTPATIENT
Start: 2024-02-13

## 2024-02-13 RX ORDER — ONDANSETRON 2 MG/ML
INJECTION INTRAMUSCULAR; INTRAVENOUS PRN
Status: DISCONTINUED | OUTPATIENT
Start: 2024-02-13 | End: 2024-02-13

## 2024-02-13 RX ORDER — ONDANSETRON 4 MG/1
4 TABLET, ORALLY DISINTEGRATING ORAL EVERY 30 MIN PRN
Status: DISCONTINUED | OUTPATIENT
Start: 2024-02-13 | End: 2024-02-13 | Stop reason: HOSPADM

## 2024-02-13 RX ORDER — TRANEXAMIC ACID 10 MG/ML
1 INJECTION, SOLUTION INTRAVENOUS ONCE
Status: COMPLETED | OUTPATIENT
Start: 2024-02-13 | End: 2024-02-13

## 2024-02-13 RX ORDER — ONDANSETRON 2 MG/ML
4 INJECTION INTRAMUSCULAR; INTRAVENOUS EVERY 30 MIN PRN
Status: DISCONTINUED | OUTPATIENT
Start: 2024-02-13 | End: 2024-02-13 | Stop reason: HOSPADM

## 2024-02-13 RX ORDER — LABETALOL HYDROCHLORIDE 5 MG/ML
10 INJECTION, SOLUTION INTRAVENOUS
Status: DISCONTINUED | OUTPATIENT
Start: 2024-02-13 | End: 2024-02-13 | Stop reason: HOSPADM

## 2024-02-13 RX ORDER — KETOROLAC TROMETHAMINE 30 MG/ML
INJECTION, SOLUTION INTRAMUSCULAR; INTRAVENOUS PRN
Status: DISCONTINUED | OUTPATIENT
Start: 2024-02-13 | End: 2024-02-13

## 2024-02-13 RX ORDER — HYDROMORPHONE HYDROCHLORIDE 1 MG/ML
0.5 INJECTION, SOLUTION INTRAMUSCULAR; INTRAVENOUS; SUBCUTANEOUS EVERY 5 MIN PRN
Status: DISCONTINUED | OUTPATIENT
Start: 2024-02-13 | End: 2024-02-13 | Stop reason: HOSPADM

## 2024-02-13 RX ORDER — PROPOFOL 10 MG/ML
INJECTION, EMULSION INTRAVENOUS PRN
Status: DISCONTINUED | OUTPATIENT
Start: 2024-02-13 | End: 2024-02-13

## 2024-02-13 RX ORDER — ACETAMINOPHEN 325 MG/1
650 TABLET ORAL EVERY 4 HOURS PRN
Qty: 50 TABLET | Refills: 0 | Status: SHIPPED | OUTPATIENT
Start: 2024-02-13

## 2024-02-13 RX ORDER — FENTANYL CITRATE 50 UG/ML
50 INJECTION, SOLUTION INTRAMUSCULAR; INTRAVENOUS EVERY 5 MIN PRN
Status: DISCONTINUED | OUTPATIENT
Start: 2024-02-13 | End: 2024-02-13 | Stop reason: HOSPADM

## 2024-02-13 RX ORDER — LIDOCAINE HYDROCHLORIDE 40 MG/ML
SOLUTION TOPICAL PRN
Status: DISCONTINUED | OUTPATIENT
Start: 2024-02-13 | End: 2024-02-13

## 2024-02-13 RX ORDER — ASPIRIN 81 MG/1
81 TABLET ORAL DAILY
Qty: 28 TABLET | Refills: 0 | Status: SHIPPED | OUTPATIENT
Start: 2024-02-13

## 2024-02-13 RX ORDER — METHOCARBAMOL 750 MG/1
750 TABLET, FILM COATED ORAL
Status: DISCONTINUED | OUTPATIENT
Start: 2024-02-13 | End: 2024-02-13 | Stop reason: HOSPADM

## 2024-02-13 RX ORDER — OXYCODONE HYDROCHLORIDE 5 MG/1
10 TABLET ORAL
Status: DISCONTINUED | OUTPATIENT
Start: 2024-02-13 | End: 2024-02-13 | Stop reason: HOSPADM

## 2024-02-13 RX ORDER — BUPIVACAINE HYDROCHLORIDE 5 MG/ML
INJECTION, SOLUTION EPIDURAL; INTRACAUDAL PRN
Status: DISCONTINUED | OUTPATIENT
Start: 2024-02-13 | End: 2024-02-13

## 2024-02-13 RX ORDER — ALBUTEROL SULFATE 0.83 MG/ML
2.5 SOLUTION RESPIRATORY (INHALATION) EVERY 4 HOURS PRN
Status: DISCONTINUED | OUTPATIENT
Start: 2024-02-13 | End: 2024-02-13 | Stop reason: HOSPADM

## 2024-02-13 RX ORDER — MAGNESIUM SULFATE HEPTAHYDRATE 40 MG/ML
2 INJECTION, SOLUTION INTRAVENOUS
Status: DISCONTINUED | OUTPATIENT
Start: 2024-02-13 | End: 2024-02-13 | Stop reason: HOSPADM

## 2024-02-13 RX ORDER — LIDOCAINE 40 MG/G
CREAM TOPICAL
Status: DISCONTINUED | OUTPATIENT
Start: 2024-02-13 | End: 2024-02-13 | Stop reason: HOSPADM

## 2024-02-13 RX ORDER — ONDANSETRON 4 MG/1
4 TABLET, ORALLY DISINTEGRATING ORAL
Status: DISCONTINUED | OUTPATIENT
Start: 2024-02-13 | End: 2024-02-13 | Stop reason: HOSPADM

## 2024-02-13 RX ORDER — ACETAMINOPHEN 325 MG/1
650 TABLET ORAL
Status: DISCONTINUED | OUTPATIENT
Start: 2024-02-13 | End: 2024-02-13 | Stop reason: HOSPADM

## 2024-02-13 RX ORDER — ONDANSETRON 4 MG/1
4 TABLET, ORALLY DISINTEGRATING ORAL EVERY 8 HOURS PRN
Qty: 4 TABLET | Refills: 0 | Status: SHIPPED | OUTPATIENT
Start: 2024-02-13

## 2024-02-13 RX ORDER — OXYCODONE HYDROCHLORIDE 5 MG/1
5-10 TABLET ORAL EVERY 4 HOURS PRN
Qty: 20 TABLET | Refills: 0 | Status: SHIPPED | OUTPATIENT
Start: 2024-02-13

## 2024-02-13 RX ORDER — HYDRALAZINE HYDROCHLORIDE 20 MG/ML
5-10 INJECTION INTRAMUSCULAR; INTRAVENOUS EVERY 10 MIN PRN
Status: DISCONTINUED | OUTPATIENT
Start: 2024-02-13 | End: 2024-02-13 | Stop reason: HOSPADM

## 2024-02-13 RX ORDER — LIDOCAINE HYDROCHLORIDE 20 MG/ML
INJECTION, SOLUTION INFILTRATION; PERINEURAL PRN
Status: DISCONTINUED | OUTPATIENT
Start: 2024-02-13 | End: 2024-02-13

## 2024-02-13 RX ORDER — KETOROLAC TROMETHAMINE 15 MG/ML
15 INJECTION, SOLUTION INTRAMUSCULAR; INTRAVENOUS
Status: DISCONTINUED | OUTPATIENT
Start: 2024-02-13 | End: 2024-02-13 | Stop reason: HOSPADM

## 2024-02-13 RX ADMIN — ROCURONIUM BROMIDE 20 MG: 50 INJECTION, SOLUTION INTRAVENOUS at 12:03

## 2024-02-13 RX ADMIN — PROPOFOL 30 MG: 10 INJECTION, EMULSION INTRAVENOUS at 10:49

## 2024-02-13 RX ADMIN — SODIUM CHLORIDE, POTASSIUM CHLORIDE, SODIUM LACTATE AND CALCIUM CHLORIDE: 600; 310; 30; 20 INJECTION, SOLUTION INTRAVENOUS at 08:42

## 2024-02-13 RX ADMIN — PROPOFOL 150 MCG/KG/MIN: 10 INJECTION, EMULSION INTRAVENOUS at 11:17

## 2024-02-13 RX ADMIN — LIDOCAINE HYDROCHLORIDE 4 ML: 40 SOLUTION TOPICAL at 09:38

## 2024-02-13 RX ADMIN — TRANEXAMIC ACID 1 G: 10 INJECTION, SOLUTION INTRAVENOUS at 09:30

## 2024-02-13 RX ADMIN — HYDROMORPHONE HYDROCHLORIDE 0.5 MG: 1 INJECTION, SOLUTION INTRAMUSCULAR; INTRAVENOUS; SUBCUTANEOUS at 12:02

## 2024-02-13 RX ADMIN — ONDANSETRON 4 MG: 2 INJECTION INTRAMUSCULAR; INTRAVENOUS at 09:36

## 2024-02-13 RX ADMIN — BUPIVACAINE HYDROCHLORIDE 20 ML: 5 INJECTION, SOLUTION EPIDURAL; INTRACAUDAL at 09:05

## 2024-02-13 RX ADMIN — SODIUM CHLORIDE, POTASSIUM CHLORIDE, SODIUM LACTATE AND CALCIUM CHLORIDE: 600; 310; 30; 20 INJECTION, SOLUTION INTRAVENOUS at 11:23

## 2024-02-13 RX ADMIN — PROPOFOL 200 MCG/KG/MIN: 10 INJECTION, EMULSION INTRAVENOUS at 09:36

## 2024-02-13 RX ADMIN — PHENYLEPHRINE HYDROCHLORIDE 50 MCG: 10 INJECTION INTRAVENOUS at 10:07

## 2024-02-13 RX ADMIN — TRANEXAMIC ACID 1 G: 10 INJECTION, SOLUTION INTRAVENOUS at 12:46

## 2024-02-13 RX ADMIN — LIDOCAINE HYDROCHLORIDE 50 MG: 20 INJECTION, SOLUTION INFILTRATION; PERINEURAL at 09:36

## 2024-02-13 RX ADMIN — ROCURONIUM BROMIDE 20 MG: 50 INJECTION, SOLUTION INTRAVENOUS at 10:51

## 2024-02-13 RX ADMIN — SUGAMMADEX 200 MG: 100 INJECTION, SOLUTION INTRAVENOUS at 12:52

## 2024-02-13 RX ADMIN — PROPOFOL 250 MG: 10 INJECTION, EMULSION INTRAVENOUS at 09:36

## 2024-02-13 RX ADMIN — FENTANYL CITRATE 100 MCG: 50 INJECTION INTRAMUSCULAR; INTRAVENOUS at 09:36

## 2024-02-13 RX ADMIN — KETOROLAC TROMETHAMINE 15 MG: 30 INJECTION, SOLUTION INTRAMUSCULAR at 09:36

## 2024-02-13 RX ADMIN — MIDAZOLAM 2 MG: 1 INJECTION INTRAMUSCULAR; INTRAVENOUS at 09:05

## 2024-02-13 RX ADMIN — PROPOFOL 150 MCG/KG/MIN: 10 INJECTION, EMULSION INTRAVENOUS at 10:47

## 2024-02-13 RX ADMIN — Medication 2 G: at 09:27

## 2024-02-13 RX ADMIN — HYDROMORPHONE HYDROCHLORIDE 0.5 MG: 1 INJECTION, SOLUTION INTRAMUSCULAR; INTRAVENOUS; SUBCUTANEOUS at 12:21

## 2024-02-13 RX ADMIN — ROCURONIUM BROMIDE 50 MG: 50 INJECTION, SOLUTION INTRAVENOUS at 09:36

## 2024-02-13 RX ADMIN — DEXAMETHASONE SODIUM PHOSPHATE 8 MG: 4 INJECTION, SOLUTION INTRA-ARTICULAR; INTRALESIONAL; INTRAMUSCULAR; INTRAVENOUS; SOFT TISSUE at 09:36

## 2024-02-13 ASSESSMENT — ACTIVITIES OF DAILY LIVING (ADL)
ADLS_ACUITY_SCORE: 30
ADLS_ACUITY_SCORE: 31

## 2024-02-13 NOTE — ANESTHESIA POSTPROCEDURE EVALUATION
Patient: Mohinder Rodriguez    Procedure: Procedure(s):  Left shoulder diagnostic arthroscopy, labral repair       Anesthesia Type:  General    Note:  Disposition: Outpatient   Postop Pain Control: Uneventful            Sign Out: Well controlled pain   PONV: No   Neuro/Psych: Uneventful            Sign Out: Acceptable/Baseline neuro status   Airway/Respiratory: Uneventful            Sign Out: Acceptable/Baseline resp. status   CV/Hemodynamics: Uneventful            Sign Out: Acceptable CV status   Other NRE: NONE   DID A NON-ROUTINE EVENT OCCUR? No           Last vitals:  Vitals Value Taken Time   /54 02/13/24 1430   Temp 97.2  F (36.2  C) 02/13/24 1410   Pulse 75 02/13/24 1430   Resp 14 02/13/24 1417   SpO2 96 % 02/13/24 1435   Vitals shown include unfiled device data.    Electronically Signed By: Bryant Jenkins MD  February 13, 2024  2:49 PM

## 2024-02-13 NOTE — LETTER
February 13, 2024      Mohinder Rodriguez  [address] [address]        To Whom It May Concern,     Mohinder Rodriguez underwent surgery on Feb 5, 2024 and may not return to sports/physical activities until 2/27/24. He will be seen in clinic for his post op appointment on 2/27/24 for further evaluation and discussion of return to activity. Please excuse him during this time.     If you have questions or concerns, please call the clinic [phone].    Sincerely,       Sanket Jenkins MD

## 2024-02-13 NOTE — OP NOTE
ORTHOPEDIC SURGERY  OPERATIVE NOTE    Mohinder Rodriguez  [id number]  2000    February 13, 2024      PREOPERATIVE DIAGNOSIS:    Left shoulder instability   Left shoulder anterior and posterior labral tear  Left shoulder Hill-Sachs Lesion    POSTOPERATIVE DIAGNOSIS:   Same    PROCEDURE:    Left shoulder diagnostic arthroscopy and arthroscopic assisted labral repair of anterior and posterior labrum    SURGEON:  Sanket Jenkins MD    ASSISTANT: Samia David PA-C; A skilled first assistant was necessary for this procedure for assistance with patient positioning, prepping, draping, surgical visualization, wound closure, and application of the dressing.     SPECIMENS:  None     COMPLICATIONS:  None    ESTIMATED BLOOD LOSS: 50cc    IMPLANTS:   Implant Name Type Inv. Item Serial No.  Lot No. LRB No. Used Action   ANCHOR SUTURE KNOTLESS FIBERTAK 2 OD1.8 MM GEN2 AR-3636 - [id number] Metallic Hardware/[place] ANCHOR SUTURE KNOTLESS FIBERTAK 2 OD1.8 MM GEN2 AR-3636  ARTHREX [id number] Left 1 Implanted   ANCHOR SUTURE KNOTLESS FIBERTAK 2 OD1.8 MM GEN2 AR-3636 - [id number] Metallic Hardware/[place] ANCHOR SUTURE KNOTLESS FIBERTAK 2 OD1.8 MM GEN2 AR-3636  ARTHREX [id number] Left 1 Implanted   ANCHOR SUTURE KNOTLESS FIBERTAK 2 OD1.8 MM GEN2 AR-3636 - [id number] Metallic Hardware/[place] ANCHOR SUTURE KNOTLESS FIBERTAK 2 OD1.8 MM GEN2 AR-3636  ARTHREX [id number] Left 1 Implanted   ANCHOR SUTURE KNOTLESS FIBERTAK 2 OD1.8 MM GEN2 AR-3636 - [id number] Metallic Hardware/[place] ANCHOR SUTURE KNOTLESS FIBERTAK 2 OD1.8 MM GEN2 AR-3636  ARTHREX [id number] Left 1 Implanted   ANCHOR SUTURE KNOTLESS FIBERTAK 2 OD1.8 MM GEN2 AR-3636 - [id number] Metallic Hardware/[place] ANCHOR SUTURE KNOTLESS FIBERTAK 2 OD1.8 MM GEN2 AR-3636  ARTHREX [id number] Left 1 Implanted   ANCHOR SUTURE KNOTLESS FIBERTAK 2 OD1.8 MM GEN2 AR-3636 - [id number] Metallic Hardware/[place] ANCHOR SUTURE KNOTLESS FIBERTAK 2 OD1.8 MM GEN2 AR-3636  ARTHREX [id number] Left 1  Implanted   ANCHOR SUTURE KNOTLESS FIBERTAK 2 OD1.8 MM GEN2 AR-3636 - [id number] Metallic Hardware/[place] ANCHOR SUTURE KNOTLESS FIBERTAK 2 OD1.8 MM GEN2 AR-3636  ARTHREX [id number] Left 1 Implanted   ANCHOR SUTURE KNOTLESS FIBERTAK 2 OD1.8 MM GEN2 AR-3636 - [id number] Metallic Hardware/[place] ANCHOR SUTURE KNOTLESS FIBERTAK 2 OD1.8 MM GEN2 AR-3636  ARTHREX [id number] Left 1 Implanted       INDICATIONS:    Mohinder Rodriguez is a 23 year old male who presented with left shoulder instability. He had a shoulder dislocation in September 2023 while playing football at [employer]. He continued to have ongoing instability since this injury. He was seen in clinic and found to have grade 1 anterior and grade 2 posterior load-and-shift, positive Verona test, positive [place]'s. A MRI was obtained which revealed a labral tear involving the entire posterior labrum and anterior inferior labrum. There was also a notable small Hill-Sachs lesion.     We discussed these findings along with potential treatment options including nonoperative and operative intervention along with the risks and benefits and expected recovery.  We discussed he is an active individual who desires to continue with  football and would be at significant risk of further injury and dislocation.  After thorough discussion he elected to proceed with a left shoulder diagnostic arthroscopy and labral repair.  We thoroughly discussed the risks and benefits of surgery along with the rehab and return to sport.       The risks of bleeding, infection, nerve damage, hardware failure, failure of healing, labral retear, arthritis, loss of motion, chronic pain, further surgery, failure to return to sport and loss of function, stiffness, and the medical risks of anesthesia were discussed. Benefits were discussed but not guaranteed and included decreased instability and improved function..     Alternatives including nonoperative management were discussed, but not  recommended.  The patient and his mother were in agreement with the plan to proceed.  The informed consent was signed and documented.  Met with the patient preoperatively to arlene the operative extremity.    OPERATIVE COURSE:    The patient was brought into the operating room and placed on operating table.  The patient underwent general anesthesia.  The patient was positioned in a lateral decubitus position on a bean bag with a shoulder positioner.  All bony prominences were well-padded.  The operative extremity was cleansed with Hibiclens. The operative extremity was then prepped with ChloraPrep.  The surgical team scrubbed in.  A WHO timeout was conducted to verify correct patient, correct extremity, presence of the surgeon's initials on the operative extremity, and administration of IV antibiotics, in this case Ancef.      A standard posterior portal was created and the camera was introduced to the shoulder joint.  A spinal needle was utilized to proximal made an anterior portal in which a threaded cannula was placed.  A diagnostic arthroscopy of the shoulder was then completed.    Diagnostic arthroscopy:  Evaluation of the glenohumeral joint revealed no chondromalacia. There was a full thickness labral tear extending from the anterior inferior labrum and involving the entire posterior labrum from 3-10 oclock.  The long head of the biceps tendon was intact with no significant tendinopathy.  The subscapularis, supraspinatus, infraspinatus tendon were noted to be intact.    Labral repair:  We then proceeded with an arthroscopic labral repair.  A total of 5 portals were utilized in the labral repair.  A low posterior portal at the 5 o'clock position, a direct posterior portal, a anterior superior and anterior inferior portal were all utilized.  A spinal needle was used to confirm trajectory of all portals. An additional posterior portal was created for a better trajectory    A shaver was then utilized along the  glenoid to stimulate subchondral bone bleeding and aid in the repair.  A labral elevator was utilized to elevate the labrum off the glenoid and aid in relocating it to its anatomic position.    A series of Arthrex fiber tack anchors were then passed utilizing the Arthrex guide followed by placement of the suture anchor.  A lasso was then utilized to pass the suture around the labrum.  The passing suture was then utilized to complete the repair.  The anchors were subsequently tensioned to aid in reducing the labrum.  A total of 7 anchors were utilized.    A probe was then inserted to evaluate the labral repair which was found to have good fixation.  The shoulder was then exsanguinated of excess arthroscopic fluid. All hardware and instruments was then removed from the patient.      The patient's wounds were then copiously irrigated.  2-0 nylon was used on the portal stitches.  The open biceps incision was closed with 2-0 Monocryl, 3-0 Monocryl and Dermabond.  The patient was placed in bilateral dressings.  The patient was then placed in UltraSling at the end of the case    All instruments were accounted for at the end of the case. The patient awoke from anesthesia and was transferred to the PACU in stable condition.      POST OP PLAN:    1.  Pain Control  2.  ASA 325mg daily for DVT prophylaxis.   3.  No PACU x-rays.   4.  NWB LUE, no lifting, sling at all times except hygiene and therapy  5.  Physical therapy scheduled outpatient   6.  Keep dressing on and dry for 72 hours. May remove dressings after 72 hours to shower, and redress with bandaids. No submerging wound.  7.  Discharge: Home        FOLLOWUP:     Follow up appointment scheduled in 2 weeks: 2/29/24 10:20am at [facility]    Dr. Jenkins's care coordinator is Alexandra Edmond. Please contact her at [phone] for questions    Dr. Jenkins sees patient's at 2 clinic locations:  [facility] [place]  [address] [place] [id number]  [facility] - [address], [address], [place] [address]      Please call the on-call phone number [phone] during evenings, nights and weekends for any urgent needs. Prescription refills must be done during business hours by calling [phone]      Sanket Jenkins MD  [facility]

## 2024-02-13 NOTE — H&P
[facility]    Orthopedics H&P    Date of Admission:  2/13/2024    Assessment & Plan     PLAN:     Mohinder Rodriguez  is a 23 year old male who presented with left shoulder instability.  He had a shoulder dislocation in September 2023 while playing football at [employer].  He continued to have ongoing instability since this injury.  He was seen in clinic and found to have grade 1 anterior and grade 2 posterior load-and-shift, positive Verona test, positive [place]'s.  A MRI was obtained which revealed a labral tear involving the entire posterior labrum and anterior inferior labrum.  There was also a notable small Hill-Sachs lesion.    We discussed these findings along with potential treatment options including nonoperative and operative intervention along with the risks and benefits and expected recovery.  We discussed he is an active individual who desires to continue with [employer] football and would be at significant risk of further injury and dislocation.  After thorough discussion he elected to proceed with a left shoulder diagnostic arthroscopy and labral repair.  We thoroughly discussed the risks and benefits of surgery along with the rehab and return to sport.    He is otherwise healthy.  No active medical issues.  No medications.  He is a low risk for surgery.    Plan for left shoulder diagnostic arthroscopy and labral repair  NPO for surgery  Low risk for orthopedic surgery    Active Problems:    * No active hospital problems. *    JAYMIE BRINK MD     Code Status    Prior    Primary Care Physician   Urbano Monson    Chief Complaint   Left shoulder instability    History is obtained from the patient    History of Present Illness   Mohinder Rodriguez  is a 23 year old male who presented with left shoulder instability.  He had a shoulder dislocation in September 2023 while playing football at [employer].  He continued to have ongoing instability since this injury.  He  denies any prior shoulder injuries.  No numbness or paresthesias.  He was able to complete his season of football but continued to have ongoing symptoms and now has difficulty with weight lifting and some ADLs.  He sustained a recent dislocation in early January while bench pressing.     He denies any active medical issues.  No recent illness.    Past Medical History   I have reviewed this patient's medical history and updated it with pertinent information if needed.   History reviewed. No pertinent past medical history.    Past Surgical History   I have reviewed this patient's surgical history and updated it with pertinent information if needed.  Past Surgical History:   Procedure Laterality Date    ENT SURGERY      ORTHOPEDIC SURGERY         Prior to Admission Medications   Prior to Admission Medications   Prescriptions Last Dose Informant Patient Reported? Taking?   ADDERALL XR 30 MG 24 hr capsule Past Month  Yes Yes   Sig: TAKE 1 CAPSULE (30 MG) BY MOUTH DAILY IN THE MORNING   amphetamine-dextroamphetamine (ADDERALL) 10 MG tablet Past Month  Yes Yes   Sig: Take 1 to 2 tablets (10-20 mg) by mouth as needed   fexofenadine (ALLEGRA) 180 MG tablet Not Taking  Yes No   Sig: Take 180 mg by mouth daily   Patient not taking: Reported on 2/7/2024   fluticasone (FLONASE) 50 MCG/ACT nasal spray Not Taking  Yes No   Sig: Spray 1 spray into both nostrils daily   Patient not taking: Reported on 2/7/2024      Facility-Administered Medications: None     Allergies   No Known Allergies    Social History   I have reviewed this patient's social history and updated it with pertinent information if needed. Mohinder Rodriguez  reports that he has never smoked. He has never used smokeless tobacco. He reports current alcohol use. He reports that he does not use drugs.    Family History   I have reviewed this patient's family history and updated it with pertinent information if needed.   Family History   Problem Relation Age of Onset    No  Known Problems Mother     No Known Problems Father     No Known Problems Brother     Lung Cancer Paternal Grandfather     Colon Cancer No family hx of     Prostate Cancer No family hx of        Review of Systems   The 10 point Review of Systems is negative other than noted in the HPI or here.     Physical Exam   Temp: 97.7  F (36.5  C) Temp src: Temporal BP: (!) 130/109 Pulse: 82   Resp: 18 SpO2: 97 % O2 Device: None (Room air)    Vital Signs with Ranges  Temp:  [97.7  F (36.5  C)] 97.7  F (36.5  C)  Pulse:  [82] 82  Resp:  [18] 18  BP: (130)/(109) 130/109  SpO2:  [97 %] 97 %  182 lbs 9.6 oz    Constitutional: awake, alert, cooperative, no apparent distress, and appears stated age  Eyes: extra-ocular muscles intact, no scleral icterus  ENT: normocepalic, atraumatic without obvious abnormality  CV:  RRR, good distal pulses,   Respiratory: no increased work of breathing, symmetric chest wall expansion, CTAB    MSK:  Left Upper Extremity  Skin is intact. No swelling. No ecchymosis  ROM: full and intact  No tenderness to palpation over the shoulder  Positive anterior/posterior load shift  Positive Obriens, Kims  Sensations intact to light touch in the median, ulnar, radial nerve distributions  FPL, EPL, Intrinsic hand muscles are intact  2+ radial pulse      Data   Most Recent 3 CBC's:  Recent Labs   Lab Test 07/03/22  0537 07/02/22  1542   WBC 10.1 10.6   HGB 14.5 14.5   MCV 87 87    297     Most Recent 3 BMP's:  Recent Labs   Lab Test 07/03/22  0537 07/02/22  1542    134   POTASSIUM 4.3 3.7   CHLORIDE 101 102   CO2 29 28   BUN 15 12   CR 0.95 1.03*   ANIONGAP 6 4   PB 10.1 9.5   * 88     Most Recent 3 INR's:No lab results found.

## 2024-02-13 NOTE — ANESTHESIA PROCEDURE NOTES
Brachial plexus Procedure Note    Pre-Procedure   Staff -        Anesthesiologist:  Bryant Jenkins MD       Performed By: anesthesiologist       Referred By: Gregory       Location: pre-op       Pre-Anesthestic Checklist: patient identified, IV checked, site marked, risks and benefits discussed, informed consent, monitors and equipment checked, pre-op evaluation, at physician/surgeon's request and post-op pain management  Timeout:       Correct Patient: Yes        Correct Procedure: Yes        Correct Site: Yes        Correct Position: Yes        Correct Laterality: Yes        Site Marked: Yes  Procedure Documentation  Procedure: Brachial plexus       Diagnosis: LABRAL TEAR       Laterality: left       Patient Position: supine       Patient Prep/Sterile Barriers: sterile gloves, mask       Skin prep: Chloraprep       Local skin infiltrated with 3 mL of 2% lidocaine.  (interscalene approach).       Needle Type: insulated and short bevel       Needle Gauge: 21.        Needle Length (Inches): 4        Ultrasound guided       1. Ultrasound was used to identify targeted nerve, plexus, vascular marker, or fascial plane and place a needle adjacent to it in real-time.       2. Ultrasound was used to visualize the spread of anesthetic in close proximity to the above referenced structure.       4. The visualized anatomic structures appeared normal.       5. There were no apparent abnormal pathologic findings.    Assessment/Narrative         The placement was negative for: blood aspirated, painful injection and site bleeding       Paresthesias: No.       Bolus given via needle. no blood aspirated via catheter.        Secured via.        Insertion/Infusion Method: Single Shot       Complications: none       Injection made incrementally with aspirations every 5 mL.     Comments:  Good LA spread around BP within the interscalene groove      FOR [facility] ([facility]) ONLY:   Pain Team Contact information: please page the  "Pain Team [facility]. Search \"Pain\". During daytime hours, please page the attending first. At night please page the resident first.      "

## 2024-02-13 NOTE — ANESTHESIA PREPROCEDURE EVALUATION
Anesthesia Pre-Procedure Evaluation    Patient: Mohinder Rodriguez   MRN: [id number] : 2000        Procedure : Procedure(s):  Left shoulder diagnostic arthroscopy, labral repair          History reviewed. No pertinent past medical history.   Past Surgical History:   Procedure Laterality Date    ENT SURGERY      ORTHOPEDIC SURGERY        No Known Allergies   Social History     Tobacco Use    Smoking status: Never    Smokeless tobacco: Never   Substance Use Topics    Alcohol use: Yes     Comment: occ      Wt Readings from Last 1 Encounters:   24 82.8 kg (182 lb 9.6 oz)        Anesthesia Evaluation   Pt has had prior anesthetic.     No history of anesthetic complications       ROS/MED HX  ENT/Pulmonary:  - neg pulmonary ROS     Neurologic:  - neg neurologic ROS     Cardiovascular:  - neg cardiovascular ROS     METS/Exercise Tolerance:     Hematologic:  - neg hematologic  ROS     Musculoskeletal: Comment: Left shoulder injury      GI/Hepatic:  - neg GI/hepatic ROS     Renal/Genitourinary:  - neg Renal ROS     Endo:  - neg endo ROS     Psychiatric/Substance Use:     (+) psychiatric history other (comment)       Infectious Disease:  - neg infectious disease ROS     Malignancy:       Other:            Physical Exam    Airway        Mallampati: II   TM distance: > 3 FB   Neck ROM: full   Mouth opening: > 3 cm    Respiratory Devices and Support         Dental       (+) Minor Abnormalities - some fillings, tiny chips      Cardiovascular          Rhythm and rate: regular and normal     Pulmonary           breath sounds clear to auscultation           OUTSIDE LABS:  CBC:   Lab Results   Component Value Date    WBC 10.1 2022    WBC 10.6 2022    HGB 14.5 2022    HGB 14.5 2022    HCT 43.3 2022    HCT 42.8 2022     2022     2022     BMP:   Lab Results   Component Value Date     2022     2022    POTASSIUM 4.3 2022    POTASSIUM 3.7  "07/02/2022    CHLORIDE 101 07/03/2022    CHLORIDE 102 07/02/2022    CO2 29 07/03/2022    CO2 28 07/02/2022    BUN 15 07/03/2022    BUN 12 07/02/2022    CR 0.95 07/03/2022    CR 1.03 (H) 07/02/2022     (H) 07/03/2022    GLC 88 07/02/2022     COAGS: No results found for: \"PTT\", \"INR\", \"FIBR\"  POC: No results found for: \"BGM\", \"HCG\", \"HCGS\"  HEPATIC:   Lab Results   Component Value Date    ALBUMIN 3.9 07/02/2022    PROTTOTAL 7.9 07/02/2022    ALT 25 07/02/2022    AST 15 07/02/2022    ALKPHOS 78 07/02/2022    BILITOTAL 1.4 (H) 07/02/2022     OTHER:   Lab Results   Component Value Date    PB 10.1 07/03/2022       Anesthesia Plan    ASA Status:  2    NPO Status:  NPO Appropriate    Anesthesia Type: General.     - Airway: ETT   Induction: Intravenous.   Maintenance: TIVA.        Consents    Anesthesia Plan(s) and associated risks, benefits, and realistic alternatives discussed. Questions answered and patient/representative(s) expressed understanding.     - Discussed:     - Discussed with:  Patient      - Extended Intubation/Ventilatory Support Discussed: No.      - Patient is DNR/DNI Status: No     Use of blood products discussed: No .     Postoperative Care    Pain management: IV analgesics, Oral pain medications, Peripheral nerve block (Single Shot), Multi-modal analgesia.   PONV prophylaxis: Ondansetron (or other 5HT-3), Dexamethasone or Solumedrol     Comments:    Other Comments:   The surgeon has given a verbal order transferring care of this patient to me for the performance of a regional analgesia block for either post-op pain control or primary anesthetic. It is requested of me because I am uniquely trained and qualified to perform this block and the surgeon is neither trained nor qualified to perform this procedure.    Ultrasound guided peripheral nerve block(s) discussed. The patient was informed that undergoing the block is not required for surgery to proceed and is optional, but they can be beneficial " "in reducing post operative pain and pain medication requirements for a period of time (several hours to a few days). Block rationale, procedure, expected results, and block specific side effects reviewed with the patient. Risks, including but not limited to bleeding, infection, nerve damage, damage to surrounding structures, medication adverse/allergic reactions, and block failure discussed. Other anesthetic/pain control options discussed.  Questions encouraged and answered.  The patient wishes to proceed.               Bryant Jenkins MD                  # Overweight: Estimated body mass index is 26.56 kg/m  as calculated from the following:    Height as of this encounter: 1.766 m (5' 9.53\").    Weight as of this encounter: 82.8 kg (182 lb 9.6 oz).      "

## 2024-02-13 NOTE — ANESTHESIA PROCEDURE NOTES
Airway       Patient location during procedure: OR       Procedure Start/Stop Times: 2/13/2024 9:38 AM  Staff -        Anesthesiologist:  Bryant Jenkins MD       CRNA: Livan Moran APRN CRNA       Performed By: CRNA  Consent for Airway        Urgency: elective  Indications and Patient Condition       Indications for airway management: litzy-procedural       Induction type:intravenous       Mask difficulty assessment: 1 - vent by mask    Final Airway Details       Final airway type: endotracheal airway       Successful airway: ETT - single  Endotracheal Airway Details        ETT size (mm): 8.0       Cuffed: yes       Cuff volume (mL): 6       Successful intubation technique: direct laryngoscopy       DL Blade Type: Jim 2       Grade View of Cords: 1       Adjucts: stylet       Position: Right       Measured from: lips       Secured at (cm): 23       Bite block used: None    Post intubation assessment        Placement verified by: capnometry, equal breath sounds and chest rise        Number of attempts at approach: 1       Number of other approaches attempted: 0       Secured with: tape       Ease of procedure: easy       Dentition: Intact and Unchanged    Medication(s) Administered   Medication Administration Time: 2/13/2024 9:38 AM

## 2024-02-13 NOTE — ANESTHESIA CARE TRANSFER NOTE
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    Anesthesia Type:   General     Note:    Oropharynx: oral airway in place  Level of Consciousness: drowsy  Oxygen Supplementation: face mask  Level of Supplemental Oxygen (L/min / FiO2): 6 lpm  Independent Airway: airway patency satisfactory and stable  Dentition: dentition unchanged  Vital Signs Stable: post-procedure vital signs reviewed and stable  Report to RN Given: handoff report given  Patient transferred to: PACU  Comments: Patient oral suctioned. Spontaneous respirations with adequate tidal volumes. Patient remains unresponsive to stimuli. To PACU with ETT in place on 8L O2 ventilating well. VSS. Report given.  Handoff Report: Identifed the Patient, Identified the Reponsible Provider, Reviewed the pertinent medical history, Discussed the surgical course, Reviewed Intra-OP anesthesia mangement and issues during anesthesia, Set expectations for post-procedure period and Allowed opportunity for questions and acknowledgement of understanding      Vitals:  Vitals Value Taken Time   BP 97/46 02/13/24 1310   Temp     Pulse 77 02/13/24 1311   Resp 13 02/13/24 1311   SpO2 100 % 02/13/24 1311   Vitals shown include unfiled device data.    Electronically Signed By: GENET Aguirre CRNA  February 13, 2024  1:13 PM

## 2025-06-05 ENCOUNTER — OFFICE VISIT (OUTPATIENT)
Dept: URGENT CARE | Facility: URGENT CARE | Age: 25
End: 2025-06-05
Payer: COMMERCIAL

## 2025-06-05 VITALS
WEIGHT: 195.9 LBS | HEART RATE: 87 BPM | DIASTOLIC BLOOD PRESSURE: 76 MMHG | TEMPERATURE: 98.1 F | SYSTOLIC BLOOD PRESSURE: 122 MMHG | RESPIRATION RATE: 26 BRPM | OXYGEN SATURATION: 97 % | HEIGHT: 69 IN | BODY MASS INDEX: 29.02 KG/M2

## 2025-06-05 DIAGNOSIS — J32.9 BACTERIAL SINUSITIS: Primary | ICD-10-CM

## 2025-06-05 DIAGNOSIS — B96.89 ACUTE BACTERIAL BRONCHITIS: ICD-10-CM

## 2025-06-05 DIAGNOSIS — J30.2 SEASONAL ALLERGIC RHINITIS, UNSPECIFIED TRIGGER: ICD-10-CM

## 2025-06-05 DIAGNOSIS — J20.8 ACUTE BACTERIAL BRONCHITIS: ICD-10-CM

## 2025-06-05 DIAGNOSIS — B96.89 BACTERIAL SINUSITIS: Primary | ICD-10-CM

## 2025-06-05 DIAGNOSIS — R07.89 CHEST TIGHTNESS: ICD-10-CM

## 2025-06-05 RX ORDER — FLUTICASONE PROPIONATE 50 MCG
1 SPRAY, SUSPENSION (ML) NASAL DAILY
Qty: 16 G | Refills: 0 | Status: SHIPPED | OUTPATIENT
Start: 2025-06-05

## 2025-06-05 RX ORDER — PREDNISONE 20 MG/1
TABLET ORAL
Qty: 15 TABLET | Refills: 0 | Status: SHIPPED | OUTPATIENT
Start: 2025-06-05

## 2025-06-05 RX ORDER — DOXYCYCLINE HYCLATE 100 MG
100 TABLET ORAL 2 TIMES DAILY
Qty: 20 TABLET | Refills: 0 | Status: SHIPPED | OUTPATIENT
Start: 2025-06-05

## 2025-06-05 RX ORDER — ALBUTEROL SULFATE 90 UG/1
2 INHALANT RESPIRATORY (INHALATION) EVERY 6 HOURS
Qty: 8.5 G | Refills: 0 | Status: SHIPPED | OUTPATIENT
Start: 2025-06-05

## 2025-06-05 NOTE — PROGRESS NOTES
Assessment & Plan     Bacterial sinusitis    You have been diagnosed with a bacterial sinus infection. Sinusitis can occur during a cold. It can also happen due to allergies to pollens and other particles in the air.  A sinus infection can sometimes cause fever, headache, and facial pain. There is often green or yellow fluid draining from the nose or into the back of the throat (post-nasal drip). This infection is treated with antibiotics.  Home care  Take the full course of antibiotics as instructed. Don't stop taking them, even when you feel better.  Drink plenty of water, hot tea, and other liquids as directed by the healthcare provider. This may help thin nasal mucus. It also may help your sinuses drain fluids.  Heat may help soothe painful areas of your face. Use a towel soaked in hot water. Or,  the shower and direct the warm spray onto your face. Using a vaporizer along with a menthol rub at night may also help soothe symptoms.     Prednisone for sinus pain  Doxy for infection  - doxycycline hyclate (VIBRA-TABS) 100 MG tablet  Dispense: 20 tablet; Refill: 0    Chest tightness    Patient has allergies and likely some reactive airway  He Is to take his allergy medications  Start on:  - predniSONE (DELTASONE) 20 MG tablet  Dispense: 15 tablet; Refill: 0  - albuterol (PROVENTIL HFA) 108 (90 Base) MCG/ACT inhaler  Dispense: 8.5 g; Refill: 0    Acute bacterial bronchitis    Bronchitis is inflammation of the bronchial tubes, which carry air to the lungs. The tubes swell and produce mucus, or phlegm. The mucus and inflamed bronchial tubes make you cough. You may have trouble breathing.  Most cases of bronchitis are caused by viruses but in your case we are more concerned about a bacterial infection. . Antibiotics usually are helpful in this situation to help you clear this bacterial infection.  Bronchitis usually develops rapidly and lasts about 2 to 3 weeks in otherwise healthy people.    - doxycycline  "hyclate (VIBRA-TABS) 100 MG tablet  Dispense: 20 tablet; Refill: 0    Allergic Rhinitis    Start on flonase everyday  - fluticasone (FLONASE) 50 MCG/ACT nasal spray  Dispense: 16 g; Refill: 0       Follow up with allergist    No follow-ups on file.    Mack Haile, [facility], PA-C  [facility] Vines is a 24 year old male who presents to clinic today for the following health issues:  Chief Complaint   Patient presents with    Urgent Care     Cough/SOB/throat feels like its closing/No taste/No Smell/took at home COVID test a week ago (-)/congested/chest feels heavy x 1 week         6/5/2025     4:10 PM   Additional Questions   Roomed by Liam   Accompanied by Spencer (Brother)         6/5/2025   Forms   Any forms needing to be completed Yes     HPI    Review of Systems  Constitutional, HEENT, cardiovascular, pulmonary, gi and gu systems are negative, except as otherwise noted.      Objective    /76   Pulse 87   Temp 98.1  F (36.7  C) (Tympanic)   Resp 26   Ht 1.753 m (5' 9\")   Wt 88.9 kg (195 lb 14.4 oz)   SpO2 97%   BMI 28.93 kg/m    Physical Exam   GENERAL: alert and no distress  EYES: Eyes grossly normal to inspection, PERRL and conjunctivae and sclerae normal  HENT: normal cephalic/atraumatic, ear canals and TM's normal, nasal mucosa edematous , rhinorrhea purulent, oropharynx clear, oral mucous membranes moist, and sinuses: maxillary, frontal tenderness on both sides  NECK: no adenopathy, no asymmetry, masses, or scars  RESP: pos for mild bronchospasms  CV: regular rate and rhythm, normal S1 S2, no S3 or S4, no murmur, click or rub, no peripheral edema  MS: no gross musculoskeletal defects noted, no edema  SKIN: no suspicious lesions or rashes  NEURO: Normal strength and tone, mentation intact and speech normal  PSYCH: mentation appears normal, affect normal/bright          "

## 2025-06-05 NOTE — LETTER
June 5, 2025      Mohinder Rodriguez  [address] [place] [address]        To Whom It May Concern:    Mohindre Rodriguez was seen in our clinic today for SOB and cough.  He will miss work today.       Sincerely,      Mack Haile, [facility] PAC      Electronically signed

## 2025-06-05 NOTE — PROGRESS NOTES
Urgent Care Clinic Visit    Chief Complaint   Patient presents with    Urgent Care     Cough/SOB/throat feels like its closing/No taste/No Smell/took at home COVID test a week ago (-)/congested/chest feels heavy x 1 week               6/5/2025     4:10 PM   Additional Questions   Roomed by Liam   Accompanied by Spencer (Brother)         6/5/2025   Forms   Any forms needing to be completed Yes     No  Does the patient have a sore throat and either history of fever >100.4 in the previous 24 hours without a cough or recent exposure to a known case of strep throat? No

## 2025-07-15 ENCOUNTER — OFFICE VISIT (OUTPATIENT)
Dept: URGENT CARE | Facility: URGENT CARE | Age: 25
End: 2025-07-15
Payer: COMMERCIAL

## 2025-07-15 VITALS
RESPIRATION RATE: 18 BRPM | WEIGHT: 200.2 LBS | BODY MASS INDEX: 29.65 KG/M2 | HEIGHT: 69 IN | SYSTOLIC BLOOD PRESSURE: 125 MMHG | HEART RATE: 122 BPM | DIASTOLIC BLOOD PRESSURE: 72 MMHG | TEMPERATURE: 97.8 F | OXYGEN SATURATION: 100 %

## 2025-07-15 DIAGNOSIS — L03.90 CELLULITIS, UNSPECIFIED CELLULITIS SITE: ICD-10-CM

## 2025-07-15 DIAGNOSIS — Z18.81 GLASS FOREIGN BODY IN SKIN: Primary | ICD-10-CM

## 2025-07-15 DIAGNOSIS — T14.8XXA GLASS FOREIGN BODY IN SKIN: Primary | ICD-10-CM

## 2025-07-15 PROCEDURE — 3074F SYST BP LT 130 MM HG: CPT | Performed by: PHYSICIAN ASSISTANT

## 2025-07-15 PROCEDURE — 99213 OFFICE O/P EST LOW 20 MIN: CPT | Mod: 25 | Performed by: PHYSICIAN ASSISTANT

## 2025-07-15 PROCEDURE — 3078F DIAST BP <80 MM HG: CPT | Performed by: PHYSICIAN ASSISTANT

## 2025-07-15 PROCEDURE — 28190 REMOVAL OF FOOT FOREIGN BODY: CPT | Performed by: PHYSICIAN ASSISTANT

## 2025-07-15 RX ORDER — LIDOCAINE HYDROCHLORIDE AND EPINEPHRINE 10; 10 MG/ML; UG/ML
5 INJECTION, SOLUTION INFILTRATION; PERINEURAL ONCE
Status: CANCELLED | OUTPATIENT
Start: 2025-07-15 | End: 2025-07-15

## 2025-07-15 RX ORDER — CEPHALEXIN 500 MG/1
500 CAPSULE ORAL 3 TIMES DAILY
Qty: 21 CAPSULE | Refills: 0 | Status: SHIPPED | OUTPATIENT
Start: 2025-07-15 | End: 2025-07-22

## 2025-07-15 NOTE — PROGRESS NOTES
Urgent Care Clinic Visit    Chief Complaint   Patient presents with    Foot Problems     Left foot pin sized whole where he stepped on glass bottle lodged in it since July 4th. Attempted to take out glass himself.                  7/15/2025     5:57 PM   Additional Questions   Roomed by Michelle BOURNE   Accompanied by Self

## 2025-07-17 NOTE — PROGRESS NOTES
"Assessment & Plan     Glass foreign body in skin    PROCEDURE  Skin prep with alcohol prep  11 blade used to make incision  Small piece of glass we removed from left heel  - REMOVAL FOREIGN BODY FOOT, SUBCUTANEOUS    Cellulitis, unspecified cellulitis site    The area of foreign body in skin is infected  Small amount of puss was removed from small abscess  Warm foot soaks  Motrin  Start on keflex  - cephALEXin (KEFLEX) 500 MG capsule  Dispense: 21 capsule; Refill: 0       No follow-ups on file.    Mack Haile, [facility], PA-C  [facility] Vines is a 24 year old male who presents to clinic today for the following health issues:  Chief Complaint   Patient presents with    Foot Problems     Left foot pin sized whole where he stepped on glass bottle lodged in it since July 4th. Attempted to take out glass himself.  Last TDaP 8/9/2023.       HPI  Review of Systems  Constitutional, HEENT, cardiovascular, pulmonary, gi and gu systems are negative, except as otherwise noted.      Objective    /72 (BP Location: Right arm)   Pulse (!) 122   Temp 97.8  F (36.6  C) (Oral)   Resp 18   Ht 1.753 m (5' 9\")   Wt 90.8 kg (200 lb 3.2 oz)   SpO2 100%   BMI 29.56 kg/m    Physical Exam   GENERAL: alert and no distress  MS: pos for left heel foreign body with small abscess  SKIN: pos for small abscess with puss.  This was removed and foreign body removed  NEURO: Normal strength and tone, mentation intact and speech normal  PSYCH: mentation appears normal, affect normal/bright          "

## (undated) DEVICE — LINEN FULL SHEET 5511

## (undated) DEVICE — GLOVE BIOGEL PI SZ 7.5 40875

## (undated) DEVICE — SU ETHILON 3-0 PS-2 18" 1669H

## (undated) DEVICE — GLOVE BIOGEL PI MICRO SZ 6.0 48560

## (undated) DEVICE — DRSG ABDOMINAL 07 1/2X8" 7197D

## (undated) DEVICE — BAG CLEAR TRASH 1.3M 39X33" P4040C

## (undated) DEVICE — DEVICE PASSER LASSO 25DEG CVD RT AR-6068-25TR

## (undated) DEVICE — IMM SHOULDER  ABDUCT ULTRASLING III MED 11-0449-3

## (undated) DEVICE — TUBING ARTHROSCOPY PUMP ARTHREX AR-6410

## (undated) DEVICE — DRAPE SHOULDER PACK SPLITS 29365

## (undated) DEVICE — DRSG GAUZE 4X4" 8044

## (undated) DEVICE — LINEN HALF SHEET 5512

## (undated) DEVICE — DRAPE ARTHROSCOPY SHOULDER BEACHCHAIR 29369

## (undated) DEVICE — DRAPE STERI U 1015

## (undated) DEVICE — SUCTION WATERBUG FLOOR

## (undated) DEVICE — DRAPE U-POUCH 34X29" 1067

## (undated) DEVICE — ESU GROUND PAD ADULT W/CORD E7507

## (undated) DEVICE — DRSG XEROFORM 1X8"

## (undated) DEVICE — Device

## (undated) DEVICE — GLOVE BIOGEL PI MICRO INDICATOR UNDERGLOVE SZ 6.5 48965

## (undated) DEVICE — SUCTION MANIFOLD NEPTUNE 2 SYS 4 PORT 0702-020-000

## (undated) DEVICE — DRAPE CONVERTORS U-DRAPE 60X72" 8476

## (undated) DEVICE — GLOVE BIOGEL PI MICRO INDICATOR UNDERGLOVE SZ 7.5 48975

## (undated) DEVICE — DEVICE PASSER LASSO 25DEG CVD LT AR-6068-25TL

## (undated) DEVICE — BRUSH SURGICAL SCRUB W/4% CHG SOL 25ML 371073

## (undated) DEVICE — LINEN ORTHO ACL PACK 5447

## (undated) DEVICE — BUR ARTHREX COOLCUT EXCALIBUR 4.0MMX13CM AR-8400EX

## (undated) DEVICE — DRSG ADAPTIC 3X8" 6113

## (undated) DEVICE — ARTHROSCOPIC CANNULA ARTHREX GEMINI SR8 8.25MMX9CM AR-6572

## (undated) DEVICE — PACK SHOULDER RIDGES

## (undated) DEVICE — SOL NACL 0.9% IRRIG 3000ML BAG 2B7477

## (undated) DEVICE — ABLATOR ARTHREX APOLLO RF MP90 ASPIRATING 90DEG AR-9811

## (undated) RX ORDER — ONDANSETRON 2 MG/ML
INJECTION INTRAMUSCULAR; INTRAVENOUS
Status: DISPENSED
Start: 2024-02-13

## (undated) RX ORDER — PROPOFOL 10 MG/ML
INJECTION, EMULSION INTRAVENOUS
Status: DISPENSED
Start: 2024-02-13

## (undated) RX ORDER — KETOROLAC TROMETHAMINE 30 MG/ML
INJECTION, SOLUTION INTRAMUSCULAR; INTRAVENOUS
Status: DISPENSED
Start: 2024-02-13

## (undated) RX ORDER — DEXAMETHASONE SODIUM PHOSPHATE 4 MG/ML
INJECTION, SOLUTION INTRA-ARTICULAR; INTRALESIONAL; INTRAMUSCULAR; INTRAVENOUS; SOFT TISSUE
Status: DISPENSED
Start: 2024-02-13

## (undated) RX ORDER — FENTANYL CITRATE 50 UG/ML
INJECTION, SOLUTION INTRAMUSCULAR; INTRAVENOUS
Status: DISPENSED
Start: 2024-02-13

## (undated) RX ORDER — FENTANYL CITRATE-0.9 % NACL/PF 10 MCG/ML
PLASTIC BAG, INJECTION (ML) INTRAVENOUS
Status: DISPENSED
Start: 2024-02-13

## (undated) RX ORDER — LIDOCAINE HYDROCHLORIDE 10 MG/ML
INJECTION, SOLUTION EPIDURAL; INFILTRATION; INTRACAUDAL; PERINEURAL
Status: DISPENSED
Start: 2024-02-13

## (undated) RX ORDER — CEFAZOLIN SODIUM/WATER 2 G/20 ML
SYRINGE (ML) INTRAVENOUS
Status: DISPENSED
Start: 2024-02-13

## (undated) RX ORDER — TRANEXAMIC ACID 10 MG/ML
INJECTION, SOLUTION INTRAVENOUS
Status: DISPENSED
Start: 2024-02-13